# Patient Record
Sex: MALE | Race: OTHER | HISPANIC OR LATINO | Employment: FULL TIME | URBAN - METROPOLITAN AREA
[De-identification: names, ages, dates, MRNs, and addresses within clinical notes are randomized per-mention and may not be internally consistent; named-entity substitution may affect disease eponyms.]

---

## 2023-08-15 ENCOUNTER — HOSPITAL ENCOUNTER (EMERGENCY)
Facility: HOSPITAL | Age: 33
Discharge: HOME | End: 2023-08-15
Attending: INTERNAL MEDICINE

## 2023-08-15 ENCOUNTER — APPOINTMENT (EMERGENCY)
Dept: CT IMAGING | Facility: HOSPITAL | Age: 33
End: 2023-08-15

## 2023-08-15 VITALS
DIASTOLIC BLOOD PRESSURE: 90 MMHG | TEMPERATURE: 97.7 F | RESPIRATION RATE: 18 BRPM | OXYGEN SATURATION: 98 % | HEART RATE: 93 BPM | SYSTOLIC BLOOD PRESSURE: 150 MMHG

## 2023-08-15 DIAGNOSIS — N23 URETERAL COLIC: Primary | ICD-10-CM

## 2023-08-15 DIAGNOSIS — N20.0 NEPHROLITHIASIS: ICD-10-CM

## 2023-08-15 LAB
ALBUMIN SERPL BCP-MCNC: 4.6 G/DL (ref 3.5–5)
ALP SERPL-CCNC: 55 U/L (ref 34–104)
ALT SERPL W P-5'-P-CCNC: 17 U/L (ref 7–52)
ANION GAP SERPL CALCULATED.3IONS-SCNC: 12 MMOL/L
AST SERPL W P-5'-P-CCNC: 16 U/L (ref 13–39)
BACTERIA UR QL AUTO: NORMAL /HPF
BASOPHILS # BLD MANUAL: 0 THOUSAND/UL (ref 0–0.1)
BASOPHILS NFR MAR MANUAL: 0 % (ref 0–1)
BILIRUB SERPL-MCNC: 0.36 MG/DL (ref 0.2–1)
BILIRUB UR QL STRIP: NEGATIVE
BUN SERPL-MCNC: 13 MG/DL (ref 5–25)
CALCIUM SERPL-MCNC: 9.2 MG/DL (ref 8.4–10.2)
CHLORIDE SERPL-SCNC: 103 MMOL/L (ref 96–108)
CLARITY UR: CLEAR
CO2 SERPL-SCNC: 23 MMOL/L (ref 21–32)
COLOR UR: YELLOW
CREAT SERPL-MCNC: 1.28 MG/DL (ref 0.6–1.3)
EOSINOPHIL # BLD MANUAL: 0 THOUSAND/UL (ref 0–0.4)
EOSINOPHIL NFR BLD MANUAL: 0 % (ref 0–6)
ERYTHROCYTE [DISTWIDTH] IN BLOOD BY AUTOMATED COUNT: 12.5 % (ref 11.6–15.1)
GFR SERPL CREATININE-BSD FRML MDRD: 72 ML/MIN/1.73SQ M
GLUCOSE SERPL-MCNC: 150 MG/DL (ref 65–140)
GLUCOSE UR STRIP-MCNC: NEGATIVE MG/DL
HCT VFR BLD AUTO: 44.5 % (ref 36.5–49.3)
HGB BLD-MCNC: 14.7 G/DL (ref 12–17)
HGB UR QL STRIP.AUTO: NEGATIVE
KETONES UR STRIP-MCNC: NEGATIVE MG/DL
LEUKOCYTE ESTERASE UR QL STRIP: NEGATIVE
LYMPHOCYTES # BLD AUTO: 59 % (ref 14–44)
LYMPHOCYTES # BLD AUTO: 6.33 THOUSAND/UL (ref 0.6–4.47)
MCH RBC QN AUTO: 27.4 PG (ref 26.8–34.3)
MCHC RBC AUTO-ENTMCNC: 33 G/DL (ref 31.4–37.4)
MCV RBC AUTO: 83 FL (ref 82–98)
MONOCYTES # BLD AUTO: 0.43 THOUSAND/UL (ref 0–1.22)
MONOCYTES NFR BLD: 4 % (ref 4–12)
NEUTROPHILS # BLD MANUAL: 3.97 THOUSAND/UL (ref 1.85–7.62)
NEUTS SEG NFR BLD AUTO: 37 % (ref 43–75)
NITRITE UR QL STRIP: NEGATIVE
NON-SQ EPI CELLS URNS QL MICRO: NORMAL /HPF
PH UR STRIP.AUTO: 5.5 [PH]
PLATELET # BLD AUTO: 324 THOUSANDS/UL (ref 149–390)
PLATELET BLD QL SMEAR: ADEQUATE
PMV BLD AUTO: 10.1 FL (ref 8.9–12.7)
POTASSIUM SERPL-SCNC: 3.4 MMOL/L (ref 3.5–5.3)
PROT SERPL-MCNC: 7.9 G/DL (ref 6.4–8.4)
PROT UR STRIP-MCNC: ABNORMAL MG/DL
RBC # BLD AUTO: 5.37 MILLION/UL (ref 3.88–5.62)
RBC #/AREA URNS AUTO: NORMAL /HPF
RBC MORPH BLD: NORMAL
SODIUM SERPL-SCNC: 138 MMOL/L (ref 135–147)
SP GR UR STRIP.AUTO: >=1.03 (ref 1–1.03)
UROBILINOGEN UR QL STRIP.AUTO: 0.2 E.U./DL
WBC # BLD AUTO: 10.73 THOUSAND/UL (ref 4.31–10.16)
WBC #/AREA URNS AUTO: NORMAL /HPF

## 2023-08-15 PROCEDURE — 96374 THER/PROPH/DIAG INJ IV PUSH: CPT

## 2023-08-15 PROCEDURE — 74176 CT ABD & PELVIS W/O CONTRAST: CPT

## 2023-08-15 PROCEDURE — 80053 COMPREHEN METABOLIC PANEL: CPT | Performed by: INTERNAL MEDICINE

## 2023-08-15 PROCEDURE — 96375 TX/PRO/DX INJ NEW DRUG ADDON: CPT

## 2023-08-15 PROCEDURE — 36415 COLL VENOUS BLD VENIPUNCTURE: CPT | Performed by: INTERNAL MEDICINE

## 2023-08-15 PROCEDURE — 99284 EMERGENCY DEPT VISIT MOD MDM: CPT

## 2023-08-15 PROCEDURE — 99285 EMERGENCY DEPT VISIT HI MDM: CPT | Performed by: INTERNAL MEDICINE

## 2023-08-15 PROCEDURE — 81001 URINALYSIS AUTO W/SCOPE: CPT | Performed by: INTERNAL MEDICINE

## 2023-08-15 PROCEDURE — G1004 CDSM NDSC: HCPCS

## 2023-08-15 PROCEDURE — 96361 HYDRATE IV INFUSION ADD-ON: CPT

## 2023-08-15 PROCEDURE — 85027 COMPLETE CBC AUTOMATED: CPT | Performed by: INTERNAL MEDICINE

## 2023-08-15 PROCEDURE — 85007 BL SMEAR W/DIFF WBC COUNT: CPT | Performed by: INTERNAL MEDICINE

## 2023-08-15 RX ORDER — ONDANSETRON 2 MG/ML
4 INJECTION INTRAMUSCULAR; INTRAVENOUS ONCE
Status: COMPLETED | OUTPATIENT
Start: 2023-08-15 | End: 2023-08-15

## 2023-08-15 RX ORDER — KETOROLAC TROMETHAMINE 30 MG/ML
15 INJECTION, SOLUTION INTRAMUSCULAR; INTRAVENOUS ONCE
Status: COMPLETED | OUTPATIENT
Start: 2023-08-15 | End: 2023-08-15

## 2023-08-15 RX ORDER — MORPHINE SULFATE 4 MG/ML
4 INJECTION, SOLUTION INTRAMUSCULAR; INTRAVENOUS ONCE
Status: COMPLETED | OUTPATIENT
Start: 2023-08-15 | End: 2023-08-15

## 2023-08-15 RX ADMIN — MORPHINE SULFATE 4 MG: 4 INJECTION INTRAVENOUS at 20:30

## 2023-08-15 RX ADMIN — SODIUM CHLORIDE 1000 ML: 0.9 INJECTION, SOLUTION INTRAVENOUS at 20:30

## 2023-08-15 RX ADMIN — ONDANSETRON 4 MG: 2 INJECTION INTRAMUSCULAR; INTRAVENOUS at 20:30

## 2023-08-15 RX ADMIN — KETOROLAC TROMETHAMINE 15 MG: 30 INJECTION, SOLUTION INTRAMUSCULAR; INTRAVENOUS at 20:29

## 2023-08-15 NOTE — Clinical Note
Rochejuliocesar David was seen and treated in our emergency department on 8/15/2023. No restrictions            Diagnosis:     Uriel Arias  may return to work on return date. He may return on this date: 08/18/2023         If you have any questions or concerns, please don't hesitate to call.       Luisa Ruiz RN    ______________________________           _______________          _______________  Hospital Representative                              Date                                Time

## 2023-08-16 NOTE — ED PROVIDER NOTES
History  Chief Complaint   Patient presents with   • Flank Pain     Pt presents to the ed with right sided flank pain that started 30mins ago, reports its feels like kidney stones he has had in the past, no meds pta       This is a 27years old came for having right flank pain which is radiating to his right testicle. Patient has pain about 30 minutes PTA. Patient has nausea and he vomited x1. Patient denies any dysuria hematuria. Patient has history of kidney stones in the past.  Patient stated pain is 10/10. Patient denies abdominal pain. Patient has no fever. Patient has no medical history except kidney stones. None       History reviewed. No pertinent past medical history. History reviewed. No pertinent surgical history. History reviewed. No pertinent family history. I have reviewed and agree with the history as documented. E-Cigarette/Vaping     E-Cigarette/Vaping Substances     Social History     Tobacco Use   • Smoking status: Never   • Smokeless tobacco: Never       Review of Systems   Constitutional: Negative for diaphoresis, fatigue and fever. HENT: Negative for congestion, ear discharge, ear pain, sinus pressure, sneezing, sore throat, tinnitus and trouble swallowing. Respiratory: Negative for cough, chest tightness and shortness of breath. Cardiovascular: Negative for chest pain, palpitations and leg swelling. Gastrointestinal: Positive for vomiting. Negative for abdominal pain, diarrhea and nausea. Genitourinary: Positive for flank pain and testicular pain. Negative for decreased urine volume, difficulty urinating, dysuria, hematuria, penile swelling, scrotal swelling and urgency. Musculoskeletal: Negative for arthralgias, back pain, gait problem, neck pain and neck stiffness. Skin: Negative for color change, pallor and rash. Neurological: Negative for dizziness, syncope, weakness, light-headedness and headaches. Hematological: Negative for adenopathy.  Does not bruise/bleed easily. Psychiatric/Behavioral: Negative for agitation and behavioral problems. Physical Exam  Physical Exam  Vitals and nursing note reviewed. Constitutional:       General: He is not in acute distress. Appearance: He is well-developed. He is not diaphoretic. HENT:      Head: Normocephalic and atraumatic. Right Ear: Ear canal normal.      Left Ear: Ear canal normal.      Nose: Nose normal. No congestion or rhinorrhea. Mouth/Throat:      Pharynx: No oropharyngeal exudate or posterior oropharyngeal erythema. Eyes:      Extraocular Movements: Extraocular movements intact. Pupils: Pupils are equal, round, and reactive to light. Neck:      Vascular: No carotid bruit. Cardiovascular:      Rate and Rhythm: Normal rate and regular rhythm. Heart sounds: Normal heart sounds. No murmur heard. No friction rub. Pulmonary:      Effort: Pulmonary effort is normal. No respiratory distress. Breath sounds: Normal breath sounds. No wheezing, rhonchi or rales. Chest:      Chest wall: No tenderness. Abdominal:      General: Bowel sounds are normal. There is no distension. Palpations: Abdomen is soft. There is no mass. Tenderness: There is no abdominal tenderness. There is right CVA tenderness. There is no left CVA tenderness, guarding or rebound. Hernia: No hernia is present. Musculoskeletal:         General: No tenderness or deformity. Normal range of motion. Cervical back: Normal range of motion and neck supple. No rigidity or tenderness. Lymphadenopathy:      Cervical: No cervical adenopathy. Skin:     General: Skin is warm and dry. Capillary Refill: Capillary refill takes less than 2 seconds. Coloration: Skin is not jaundiced or pale. Findings: No bruising, erythema, lesion or rash. Neurological:      Mental Status: He is alert and oriented to person, place, and time.    Psychiatric:         Behavior: Behavior normal. Vital Signs  ED Triage Vitals   Temperature Pulse Respirations Blood Pressure SpO2   08/15/23 2011 08/15/23 2011 08/15/23 2021 08/15/23 2011 08/15/23 2011   97.7 °F (36.5 °C) 93 18 150/90 98 %      Temp Source Heart Rate Source Patient Position - Orthostatic VS BP Location FiO2 (%)   08/15/23 2011 08/15/23 2011 08/15/23 2011 08/15/23 2011 --   Oral Monitor Sitting Left arm       Pain Score       08/15/23 2029       10 - Worst Possible Pain           Vitals:    08/15/23 2011   BP: 150/90   Pulse: 93   Patient Position - Orthostatic VS: Sitting         Visual Acuity      ED Medications  Medications   ketorolac (TORADOL) injection 15 mg (15 mg Intravenous Given 8/15/23 2029)   morphine injection 4 mg (4 mg Intravenous Given 8/15/23 2030)   ondansetron (ZOFRAN) injection 4 mg (4 mg Intravenous Given 8/15/23 2030)   sodium chloride 0.9 % bolus 1,000 mL (0 mL Intravenous Stopped 8/15/23 2212)       Diagnostic Studies  Results Reviewed     Procedure Component Value Units Date/Time    Urine Microscopic [[id number]]  (Normal) Collected: 08/15/23 2150    Lab Status: Final result Specimen: Urine, Clean Catch Updated: 08/15/23 2207     RBC, UA None Seen /hpf      WBC, UA 0-1 /hpf      Epithelial Cells Occasional /hpf      Bacteria, UA Occasional /hpf     RBC Morphology Reflex Test [[id number]] Collected: 08/15/23 2040    Lab Status: Final result Specimen: Blood from Arm, Left Updated: 08/15/23 2201    UA w Reflex to Microscopic w Reflex to Culture [[id number]]  (Abnormal) Collected: 08/15/23 2150    Lab Status: Final result Specimen: Urine, Clean Catch Updated: 08/15/23 2200     Color, UA Yellow     Clarity, UA Clear     Specific Gravity, UA >=1.030     pH, UA 5.5     Leukocytes, UA Negative     Nitrite, UA Negative     Protein, UA Trace mg/dl      Glucose, UA Negative mg/dl      Ketones, UA Negative mg/dl      Urobilinogen, UA 0.2 E.U./dl      Bilirubin, UA Negative     Occult Blood, UA Negative    CBC and differential [[id number]]  (Abnormal) Collected: 08/15/23 2040    Lab Status: Final result Specimen: Blood from Arm, Left Updated: 08/15/23 2121     WBC 10.73 Thousand/uL      RBC 5.37 Million/uL      Hemoglobin 14.7 g/dL      Hematocrit 44.5 %      MCV 83 fL      MCH 27.4 pg      MCHC 33.0 g/dL      RDW 12.5 %      MPV 10.1 fL      Platelets 994 Thousands/uL     Narrative: This is an appended report. These results have been appended to a previously verified report.     Manual Differential(PHLEBS Do Not Order) [[id number]]  (Abnormal) Collected: 08/15/23 2040    Lab Status: Final result Specimen: Blood from Arm, Left Updated: 08/15/23 2121     Segmented % 37 %      Lymphocytes % 59 %      Monocytes % 4 %      Eosinophils, % 0 %      Basophils % 0 %      Absolute Neutrophils 3.97 Thousand/uL      Lymphocytes Absolute 6.33 Thousand/uL      Monocytes Absolute 0.43 Thousand/uL      Eosinophils Absolute 0.00 Thousand/uL      Basophils Absolute 0.00 Thousand/uL      Total Counted --     RBC Morphology Normal     Platelet Estimate Adequate    Comprehensive metabolic panel [[id number]]  (Abnormal) Collected: 08/15/23 2040    Lab Status: Final result Specimen: Blood from Arm, Left Updated: 08/15/23 2058     Sodium 138 mmol/L      Potassium 3.4 mmol/L      Chloride 103 mmol/L      CO2 23 mmol/L      ANION GAP 12 mmol/L      BUN 13 mg/dL      Creatinine 1.28 mg/dL      Glucose 150 mg/dL      Calcium 9.2 mg/dL      AST 16 U/L      ALT 17 U/L      Alkaline Phosphatase 55 U/L      Total Protein 7.9 g/dL      Albumin 4.6 g/dL      Total Bilirubin 0.36 mg/dL      eGFR 72 ml/min/1.73sq m     Narrative:      [place] guidelines for Chronic Kidney Disease (CKD):   •  Stage 1 with normal or high GFR (GFR > 90 mL/min/1.73 square meters)  •  Stage 2 Mild CKD (GFR = 60-89 mL/min/1.73 square meters)  •  Stage 3A Moderate CKD (GFR = 45-59 mL/min/1.73 square meters)  •  Stage 3B Moderate CKD (GFR = 30-44 mL/min/1.73 square meters)  •  Stage 4 Severe CKD (GFR = 15-29 mL/min/1.73 square meters)  •  Stage 5 End Stage CKD (GFR <15 mL/min/1.73 square meters)  Note: GFR calculation is accurate only with a steady state creatinine                 CT renal stone study abdomen pelvis wo contrast   Final Result by Luz Elena Buchanan MD (08/15 2143)      1.  5 mm right UVJ stone causes moderate hydronephrosis   2. Additional bilateral nonobstructing renal stones               Workstation performed: [id number]                    Procedures  Procedures         ED Course                               SBIRT 22yo+    Flowsheet Row Most Recent Value   Initial Alcohol Screen: US AUDIT-C     1. How often do you have a drink containing alcohol? 0 Filed at: 08/15/2023 2054   2. How many drinks containing alcohol do you have on a typical day you are drinking? 0 Filed at: 08/15/2023 2054   3a. Male UNDER 65: How often do you have five or more drinks on one occasion? 0 Filed at: 08/15/2023 2054   3b. FEMALE Any Age, or MALE 65+: How often do you have 4 or more drinks on one occassion? 0 Filed at: 08/15/2023 2054   Audit-C Score 0 Filed at: 08/15/2023 2054   JUAN: How many times in the past year have you. .. Used an illegal drug or used a prescription medication for non-medical reasons? Never Filed at: 08/15/2023 2054                    Medical Decision Making  This is a 27years old came for having right flank pain radiating into his right testicle. Patient has this pain about half an hour before he came to the ER. Pain was so severe patient denies any dysuria hematuria. Physical exam shows mild tenderness in the right flank area. Amount and/or Complexity of Data Reviewed  Labs: ordered. Radiology: ordered. Risk  Prescription drug management.           Disposition  Final diagnoses:   Ureteral colic   Nephrolithiasis     Time reflects when diagnosis was documented in both MDM as applicable and the Disposition within this note     Time User Action Codes Description Comment    8/15/2023  8:59 PM Eliseo Marvin Add [J75] Ureteral colic     3/36/5847  0:02 PM Eliseo Marvin Add [N20.1] Ureterolithiasis     8/15/2023  9:49 PM Jassi Bennington Remove [N20.1] Ureterolithiasis     8/15/2023  9:49 PM Jassi Bennington Add [N20.0] Nephrolithiasis       ED Disposition     ED Disposition   Discharge    Condition   Stable    Date/Time   Tue Aug 15, 2023  9:49 PM    Comment   Cameron Rae discharge to home/self care. Follow-up Information     Follow up With Specialties Details Why Contact Info Additional 1016 [facility]) Urology Schedule an appointment as soon as possible for a visit in 3 days For follow-up [address] [id number] 3318 [facility] [address], [address] [facility]          There are no discharge medications for this patient. No discharge procedures on file.     PDMP Review     None          ED Provider  Electronically Signed by           Lakeisha Dean MD  08/16/23 4100

## 2023-08-16 NOTE — ED CARE HANDOFF
Emergency Department Sign Out Note        Sign out and transfer of care from Dr. Dafne Pierre. See Separate Emergency Department note. The patient, Rosalina Aguilar, was evaluated by the previous provider for flank pain. Workup Completed:  Labs and imaging complete    ED Course / Workup Pending (followup):  Pending CT read, and likely discharge home. Procedures  Medical Decision Making  The patient was found to have a 5 mm stone, he is safe for discharge home, as he does not have a urinary tract infection, and he will be instructed to follow-up with urology as an outpatient, drink plenty of fluids, and return if pain worsens or does not improve over the next few days. Amount and/or Complexity of Data Reviewed  Labs: ordered. Radiology: ordered. Risk  Prescription drug management. Disposition  Final diagnoses:   Ureteral colic   Nephrolithiasis     Time reflects when diagnosis was documented in both MDM as applicable and the Disposition within this note     Time User Action Codes Description Comment    8/15/2023  8:59 PM Eliseo Marvin Add [B84] Ureteral colic     8/63/6261  9:16 PM Eliseo Marvin Add [N20.1] Ureterolithiasis     8/15/2023  9:49 PM Lizy Dunn Remove [N20.1] Ureterolithiasis     8/15/2023  9:49 PM Lizy Dunn Add [N20.0] Nephrolithiasis       ED Disposition     ED Disposition   Discharge    Condition   Stable    Date/Time   Tue Aug 15, 2023  9:49 PM    Comment   Cameron Rae discharge to home/self care.                Follow-up Information     Follow up With Specialties Details Why Contact Info Additional 3413 [facility] Schedule an appointment as soon as possible for a visit in 3 days For follow-up [address] [phone] 9595 [facility]), [address] [place] [address], [address] [facility] There are no discharge medications for this patient. No discharge procedures on file.        ED Provider  Electronically Signed by     Osorio Henry MD  08/15/23 2759

## 2023-08-16 NOTE — ED NOTES
Discharge instructions reviewed with pt. Pt verbalized understanding. And has no further questions at this time. Pt ambulatory off unit with steady gait.       Nat Lin RN  08/15/23 0265

## 2023-08-17 ENCOUNTER — HOSPITAL ENCOUNTER (OUTPATIENT)
Facility: HOSPITAL | Age: 33
Setting detail: OBSERVATION
Discharge: ANOTHER HOSPITAL | End: 2023-08-18
Attending: EMERGENCY MEDICINE | Admitting: INTERNAL MEDICINE
Payer: COMMERCIAL

## 2023-08-17 ENCOUNTER — APPOINTMENT (OUTPATIENT)
Dept: RADIOLOGY | Facility: HOSPITAL | Age: 33
End: 2023-08-17
Payer: COMMERCIAL

## 2023-08-17 DIAGNOSIS — N23 RENAL COLIC ON RIGHT SIDE: ICD-10-CM

## 2023-08-17 DIAGNOSIS — N20.1 URETERIC STONE: ICD-10-CM

## 2023-08-17 DIAGNOSIS — N17.9 ACUTE KIDNEY INJURY (HCC): Primary | ICD-10-CM

## 2023-08-17 PROBLEM — D72.829 LEUKOCYTOSIS: Status: ACTIVE | Noted: 2023-08-17

## 2023-08-17 LAB
ANION GAP SERPL CALCULATED.3IONS-SCNC: 9 MMOL/L
BACTERIA UR QL AUTO: NORMAL /HPF
BASOPHILS # BLD AUTO: 0.02 THOUSANDS/ÂΜL (ref 0–0.1)
BASOPHILS NFR BLD AUTO: 0 % (ref 0–1)
BILIRUB UR QL STRIP: NEGATIVE
BUN SERPL-MCNC: 18 MG/DL (ref 5–25)
CALCIUM SERPL-MCNC: 9.4 MG/DL (ref 8.4–10.2)
CHLORIDE SERPL-SCNC: 104 MMOL/L (ref 96–108)
CLARITY UR: CLEAR
CO2 SERPL-SCNC: 25 MMOL/L (ref 21–32)
COLOR UR: YELLOW
CREAT SERPL-MCNC: 2.12 MG/DL (ref 0.6–1.3)
EOSINOPHIL # BLD AUTO: 0.04 THOUSAND/ÂΜL (ref 0–0.61)
EOSINOPHIL NFR BLD AUTO: 0 % (ref 0–6)
ERYTHROCYTE [DISTWIDTH] IN BLOOD BY AUTOMATED COUNT: 12.7 % (ref 11.6–15.1)
GFR SERPL CREATININE-BSD FRML MDRD: 39 ML/MIN/1.73SQ M
GLUCOSE SERPL-MCNC: 92 MG/DL (ref 65–140)
GLUCOSE UR STRIP-MCNC: NEGATIVE MG/DL
HCT VFR BLD AUTO: 43.9 % (ref 36.5–49.3)
HGB BLD-MCNC: 14.7 G/DL (ref 12–17)
HGB UR QL STRIP.AUTO: ABNORMAL
IMM GRANULOCYTES # BLD AUTO: 0.04 THOUSAND/UL (ref 0–0.2)
IMM GRANULOCYTES NFR BLD AUTO: 0 % (ref 0–2)
KETONES UR STRIP-MCNC: NEGATIVE MG/DL
LEUKOCYTE ESTERASE UR QL STRIP: NEGATIVE
LYMPHOCYTES # BLD AUTO: 3.19 THOUSANDS/ÂΜL (ref 0.6–4.47)
LYMPHOCYTES NFR BLD AUTO: 22 % (ref 14–44)
MCH RBC QN AUTO: 27.4 PG (ref 26.8–34.3)
MCHC RBC AUTO-ENTMCNC: 33.5 G/DL (ref 31.4–37.4)
MCV RBC AUTO: 82 FL (ref 82–98)
MONOCYTES # BLD AUTO: 1.22 THOUSAND/ÂΜL (ref 0.17–1.22)
MONOCYTES NFR BLD AUTO: 9 % (ref 4–12)
NEUTROPHILS # BLD AUTO: 9.82 THOUSANDS/ÂΜL (ref 1.85–7.62)
NEUTS SEG NFR BLD AUTO: 69 % (ref 43–75)
NITRITE UR QL STRIP: NEGATIVE
NON-SQ EPI CELLS URNS QL MICRO: NORMAL /HPF
NRBC BLD AUTO-RTO: 0 /100 WBCS
PH UR STRIP.AUTO: 5.5 [PH]
PLATELET # BLD AUTO: 283 THOUSANDS/UL (ref 149–390)
PMV BLD AUTO: 9.9 FL (ref 8.9–12.7)
POTASSIUM SERPL-SCNC: 4.1 MMOL/L (ref 3.5–5.3)
PROT UR STRIP-MCNC: NEGATIVE MG/DL
RBC # BLD AUTO: 5.37 MILLION/UL (ref 3.88–5.62)
RBC #/AREA URNS AUTO: NORMAL /HPF
SODIUM SERPL-SCNC: 138 MMOL/L (ref 135–147)
SP GR UR STRIP.AUTO: 1.02 (ref 1–1.03)
UROBILINOGEN UR QL STRIP.AUTO: 0.2 E.U./DL
WBC # BLD AUTO: 14.33 THOUSAND/UL (ref 4.31–10.16)
WBC #/AREA URNS AUTO: NORMAL /HPF

## 2023-08-17 PROCEDURE — 99284 EMERGENCY DEPT VISIT MOD MDM: CPT

## 2023-08-17 PROCEDURE — 99222 1ST HOSP IP/OBS MODERATE 55: CPT | Performed by: INTERNAL MEDICINE

## 2023-08-17 PROCEDURE — 80048 BASIC METABOLIC PNL TOTAL CA: CPT | Performed by: EMERGENCY MEDICINE

## 2023-08-17 PROCEDURE — 96374 THER/PROPH/DIAG INJ IV PUSH: CPT

## 2023-08-17 PROCEDURE — 99285 EMERGENCY DEPT VISIT HI MDM: CPT | Performed by: EMERGENCY MEDICINE

## 2023-08-17 PROCEDURE — 36415 COLL VENOUS BLD VENIPUNCTURE: CPT | Performed by: EMERGENCY MEDICINE

## 2023-08-17 PROCEDURE — 96375 TX/PRO/DX INJ NEW DRUG ADDON: CPT

## 2023-08-17 PROCEDURE — 96361 HYDRATE IV INFUSION ADD-ON: CPT

## 2023-08-17 PROCEDURE — 96376 TX/PRO/DX INJ SAME DRUG ADON: CPT

## 2023-08-17 PROCEDURE — 81001 URINALYSIS AUTO W/SCOPE: CPT | Performed by: EMERGENCY MEDICINE

## 2023-08-17 PROCEDURE — 85025 COMPLETE CBC W/AUTO DIFF WBC: CPT | Performed by: EMERGENCY MEDICINE

## 2023-08-17 RX ORDER — TAMSULOSIN HYDROCHLORIDE 0.4 MG/1
0.4 CAPSULE ORAL
Status: DISCONTINUED | OUTPATIENT
Start: 2023-08-17 | End: 2023-08-18 | Stop reason: HOSPADM

## 2023-08-17 RX ORDER — ACETAMINOPHEN 500 MG
500 TABLET ORAL EVERY 6 HOURS PRN
COMMUNITY

## 2023-08-17 RX ORDER — OXYCODONE HYDROCHLORIDE 5 MG/1
5 TABLET ORAL EVERY 4 HOURS PRN
Status: DISCONTINUED | OUTPATIENT
Start: 2023-08-17 | End: 2023-08-18 | Stop reason: HOSPADM

## 2023-08-17 RX ORDER — OXYBUTYNIN CHLORIDE 5 MG/1
5 TABLET ORAL 3 TIMES DAILY
Status: DISCONTINUED | OUTPATIENT
Start: 2023-08-17 | End: 2023-08-18 | Stop reason: HOSPADM

## 2023-08-17 RX ORDER — ONDANSETRON 2 MG/ML
4 INJECTION INTRAMUSCULAR; INTRAVENOUS EVERY 6 HOURS PRN
Status: DISCONTINUED | OUTPATIENT
Start: 2023-08-17 | End: 2023-08-18 | Stop reason: HOSPADM

## 2023-08-17 RX ORDER — MORPHINE SULFATE 4 MG/ML
4 INJECTION, SOLUTION INTRAMUSCULAR; INTRAVENOUS ONCE
Status: COMPLETED | OUTPATIENT
Start: 2023-08-17 | End: 2023-08-17

## 2023-08-17 RX ORDER — KETOROLAC TROMETHAMINE 30 MG/ML
15 INJECTION, SOLUTION INTRAMUSCULAR; INTRAVENOUS ONCE
Status: COMPLETED | OUTPATIENT
Start: 2023-08-17 | End: 2023-08-17

## 2023-08-17 RX ORDER — ONDANSETRON 2 MG/ML
4 INJECTION INTRAMUSCULAR; INTRAVENOUS ONCE
Status: COMPLETED | OUTPATIENT
Start: 2023-08-17 | End: 2023-08-17

## 2023-08-17 RX ORDER — SODIUM CHLORIDE, SODIUM LACTATE, POTASSIUM CHLORIDE, CALCIUM CHLORIDE 600; 310; 30; 20 MG/100ML; MG/100ML; MG/100ML; MG/100ML
150 INJECTION, SOLUTION INTRAVENOUS CONTINUOUS
Status: DISCONTINUED | OUTPATIENT
Start: 2023-08-17 | End: 2023-08-18

## 2023-08-17 RX ORDER — IBUPROFEN 400 MG/1
400 TABLET ORAL EVERY 6 HOURS PRN
COMMUNITY

## 2023-08-17 RX ADMIN — SODIUM CHLORIDE 1000 ML: 0.9 INJECTION, SOLUTION INTRAVENOUS at 15:57

## 2023-08-17 RX ADMIN — SODIUM CHLORIDE, SODIUM LACTATE, POTASSIUM CHLORIDE, AND CALCIUM CHLORIDE 150 ML/HR: .6; .31; .03; .02 INJECTION, SOLUTION INTRAVENOUS at 18:40

## 2023-08-17 RX ADMIN — OXYCODONE HYDROCHLORIDE 5 MG: 5 TABLET ORAL at 20:24

## 2023-08-17 RX ADMIN — MORPHINE SULFATE 4 MG: 4 INJECTION INTRAVENOUS at 16:01

## 2023-08-17 RX ADMIN — MORPHINE SULFATE 2 MG: 2 INJECTION, SOLUTION INTRAMUSCULAR; INTRAVENOUS at 18:43

## 2023-08-17 RX ADMIN — MORPHINE SULFATE 4 MG: 4 INJECTION INTRAVENOUS at 17:03

## 2023-08-17 RX ADMIN — OXYBUTYNIN CHLORIDE 5 MG: 5 TABLET ORAL at 20:31

## 2023-08-17 RX ADMIN — ONDANSETRON 4 MG: 2 INJECTION INTRAMUSCULAR; INTRAVENOUS at 16:00

## 2023-08-17 RX ADMIN — TAMSULOSIN HYDROCHLORIDE 0.4 MG: 0.4 CAPSULE ORAL at 18:26

## 2023-08-17 RX ADMIN — KETOROLAC TROMETHAMINE 15 MG: 30 INJECTION, SOLUTION INTRAMUSCULAR; INTRAVENOUS at 15:58

## 2023-08-17 NOTE — ASSESSMENT & PLAN NOTE
· Patient presents with right flank pain associated with nausea. · CAT scan of the abdomen was reviewed shows right UVJ stone 5 mm, also moderate hydronephrosis,  · Plan of care was discussed by the ED physician with the urologist who recommended that will give aggressive IV fluids and pain management and strain urine.    · Over the course the night the patient developed a fever and continued to have leukocytosis, blood cultures were drawn, IV antibiotics were started, and antipyretics were initiated  · Urology reviewed the case and determined the patient needs to be sent to [facility] for a stent

## 2023-08-17 NOTE — ASSESSMENT & PLAN NOTE
· Reactive secondary to right flank pain and UVJ stone. Monitor for any fever spike consider antibiotic if febrile or no improvement.   · UA did not indicate UTI  · Had a fever of 100.8 degrees F, obtain blood cultures, started ceftriaxone, will be sent to [facility] for additional care

## 2023-08-17 NOTE — ASSESSMENT & PLAN NOTE
· Patient presents with right flank pain associated with nausea. · CAT scan of the abdomen was reviewed shows right UVJ stone 5 mm, also moderate hydronephrosis,  · Plan of care was discussed by the ED physician with the urologist who recommended that will give aggressive IV fluids and pain management and strain urine. Patient if still has not passed the stone will keep n.p.o. from midnight and may need transfer out for boomerang procedure.

## 2023-08-17 NOTE — ASSESSMENT & PLAN NOTE
· ALEJANDRINA with obstructive uropathy, CT of the abdomen shows right UVJ 5 mm stone with moderate hydronephrosis, will give aggressive IV hydration and strain urine and check bladder scan. Will also start Flomax and oxybutynin. · Patient has also been taking ibuprofen/Motrin for the past 4 days.   · Will check urine eosinophils,  · Avoid nephrotoxins and hypotension and monitor electrolytes and BUNs/creatinine  · We will need to be sent to [facility] for cystoscopy and stent placement

## 2023-08-17 NOTE — ASSESSMENT & PLAN NOTE
· ALEJANDRINA with obstructive uropathy, CT of the abdomen shows right UVJ 5 mm stone with moderate hydronephrosis, will give aggressive IV hydration and strain urine and check bladder scan. Will also start Flomax and oxybutynin. · Avoid nephrotoxins and hypotension and monitor electrolytes and BUNs/creatinine  · If renal function worsens will need cystoscopy and stent placement.

## 2023-08-17 NOTE — QUICK NOTE
Received TT about patient Cameron 27year-old presenting to the ED with right flank pain for the past 4 days. Patient noted to have right UVJ 5 mm stone with moderate hydronephrosis on CT scan on 8/15/2023. Vital signs stable, afebrile. Leukocytosis of 14. Creatinine 2.12 today, previously 1.28 during previous ED visit 2 days ago. Plan:  -Admit to [facility]  -IV fluids  -Strain all urine  -Repeat BMP in the a.m. If improvement and pain controlled, patient stable for discharge.   -If worsening kidney function, will consider transfer to [facility] for operative intervention.  -N.p.o. at midnight  -Please reach out to on-call urology for any fevers or hemodynamic instability

## 2023-08-17 NOTE — H&P
[facility]  H&P  Name: Jorje Hernández 27 y.o. male I MRN: [id number]  Unit/Bed#: -01 I Date of Admission: 8/17/2023   Date of Service: 8/17/2023 I Hospital Day: 0      Assessment/Plan   Leukocytosis  Assessment & Plan  · Reactive secondary to right flank pain and UVJ stone. Monitor for any fever spike consider antibiotic if febrile or no improvement. Acute kidney injury ([address])  Assessment & Plan  · ALEJANDRINA with obstructive uropathy, CT of the abdomen shows right UVJ 5 mm stone with moderate hydronephrosis, will give aggressive IV hydration and strain urine and check bladder scan. Will also start Flomax and oxybutynin. · Patient has also been taking ibuprofen/Motrin for the past 4 days. · Will check urine eosinophils,  · Avoid nephrotoxins and hypotension and monitor electrolytes and BUNs/creatinine  · If renal function worsens will need cystoscopy and stent placement. * Ureteric stone  Assessment & Plan  · Patient presents with right flank pain associated with nausea. · CAT scan of the abdomen was reviewed shows right UVJ stone 5 mm, also moderate hydronephrosis,  · Plan of care was discussed by the ED physician with the urologist who recommended that will give aggressive IV fluids and pain management and strain urine. Patient if still has not passed the stone will keep n.p.o. from midnight and may need transfer out for boomerang procedure. VTE Pharmacologic Prophylaxis:   High Risk (Score >/= 5) - Pharmacological DVT Prophylaxis Contraindicated. Sequential Compression Devices Ordered. Code Status: Level 1 - Full Code   Discussion with family: Updated  (mother) at bedside. Anticipated Length of Stay: Patient will be admitted on an observation basis with an anticipated length of stay of less than 2 midnights secondary to Right UVJ stone, with acute kidney injury.     Total Time Spent on Date of Encounter in care of patient: 55 minutes This time was spent on one or more of the following: performing physical exam; counseling and coordination of care; obtaining or reviewing history; documenting in the medical record; reviewing/ordering tests, medications or procedures; communicating with other healthcare professionals and discussing with patient's family/caregivers. Chief Complaint: Right flank pain    History of Present Illness:  Shawn Mireles is a 27 y.o. male with no medical history presents with right flank pain for the past 4 days. Planes of nausea and one episode of vomiting. Patient has been taking Motrin/ibuprofen for the past 4 days for the pain without relief. .  Patient noted to have right UVJ 5 mm stone with moderate hydronephrosis on CT of the abdomen. Pain is intensity of 8/10. Received IV morphine and IV fluid bolus. Urology was contacted from the ED and recommended aggressive IV fluid hydration and straining of urine and if worsening renal function and persistent pain may need transfer and will keep patient n.p.o. from midnight. Patient denies of fever chills or cough. Denies of any headache or blurry vision or vomiting. Review of Systems:  Review of Systems   Gastrointestinal: Positive for nausea. Genitourinary: Positive for flank pain. All other systems reviewed and are negative. Past Medical and Surgical History:   History reviewed. No pertinent past medical history. History reviewed. No pertinent surgical history. Meds/Allergies:  Prior to Admission medications    Medication Sig Start Date End Date Taking? Authorizing Provider   acetaminophen (TYLENOL) 500 mg tablet Take 500 mg by mouth every 6 (six) hours as needed for mild pain   Yes Historical Provider, MD   ibuprofen (MOTRIN) 400 mg tablet Take 400 mg by mouth every 6 (six) hours as needed for mild pain   Yes Historical Provider, MD     I have reviewed home medications with patient personally. Allergies:    Allergies   Allergen Reactions   • Cortisone Hives       Social History:  Marital Status: Single   Occupation: employed  Patient Pre-hospital Living Situation: Home  Patient Pre-hospital Level of Mobility: walks  Patient Pre-hospital Diet Restrictions: nil  Substance Use History:   Social History     Substance and Sexual Activity   Alcohol Use Yes     Social History     Tobacco Use   Smoking Status Never   Smokeless Tobacco Never     Social History     Substance and Sexual Activity   Drug Use Never       Family History:  History reviewed. No pertinent family history. Physical Exam:     Vitals:   Blood Pressure: 158/87 (08/17/23 1545)  Pulse: 89 (08/17/23 1545)  Temperature: 97.5 °F (36.4 °C) (08/17/23 1545)  Temp Source: Oral (08/17/23 1545)  Respirations: 18 (08/17/23 1545)  Height: 5' 7" (170.2 cm) (08/17/23 1545)  Weight - Scale: 80.7 kg (178 lb) (08/17/23 1545)  SpO2: 99 % (08/17/23 1545)    Physical Exam   HEENT-PERRLA, moist oral mucosa  Neck-supple, no JVD elevation   Respiratory-equal air entry bilaterally, no rales or rhonchi  Cardiovascular system-S1, S2 heard, no murmur or gallops or rubs  Abdomen-soft, nontender, no guarding or rigidity, bowel sounds heard  Extremities-no pedal edema  Peripheral pulses palpable  Musculoskeletal-no contractures  Central nervous system-no acute focal neurological deficit ,no sensory or motor deficit noted.   Skin-no rash noted        Additional Data:     Lab Results:  Results from last 7 days   Lab Units 08/17/23  1557   WBC Thousand/uL 14.33*   HEMOGLOBIN g/dL 14.7   HEMATOCRIT % 43.9   PLATELETS Thousands/uL 283   NEUTROS PCT % 69   LYMPHS PCT % 22   MONOS PCT % 9   EOS PCT % 0     Results from last 7 days   Lab Units 08/17/23  1557 08/15/23  2040   SODIUM mmol/L 138 138   POTASSIUM mmol/L 4.1 3.4*   CHLORIDE mmol/L 104 103   CO2 mmol/L 25 23   BUN mg/dL 18 13   CREATININE mg/dL 2.12* 1.28   ANION GAP mmol/L 9 12   CALCIUM mg/dL 9.4 9.2   ALBUMIN g/dL  --  4.6   TOTAL BILIRUBIN mg/dL  --  0.36   ALK PHOS U/L  --  55   ALT U/L  --  17   AST U/L  --  16   GLUCOSE RANDOM mg/dL 92 150*                       Lines/Drains:  Invasive Devices     Peripheral Intravenous Line  Duration           Peripheral IV 08/17/23 Left Antecubital <1 day                    Imaging: Personally reviewed the following imaging: abdominal/pelvic CT  XR abdomen 1 view kub    (Results Pending)       EKG and Other Studies Reviewed on Admission:   · EKG: No EKG obtained. ** Please Note: This note has been constructed using a voice recognition system.  **

## 2023-08-17 NOTE — ED PROVIDER NOTES
History  Chief Complaint   Patient presents with   • Abdominal Pain     Pt states he was her last Tuesday for same pain in  the rt side of abdominal     The patient reports several day history of right sided flank pain. He was seen in the ED and diagnosed with a UVJ 3x5mm kidney stone causing moderate hydronephrosis. He returns to the ED with the same pain today. He denies burning or discomfort with urination. He denies left sided symptoms. NO fevers. He returned to ED today because his pain is not controlled with ibuprofen and tylenol. Prior to Admission Medications   Prescriptions Last Dose Informant Patient Reported? Taking?   acetaminophen (TYLENOL) 500 mg tablet 8/17/2023 Self Yes Yes   Sig: Take 500 mg by mouth every 6 (six) hours as needed for mild pain   ibuprofen (MOTRIN) 400 mg tablet 8/17/2023 Self Yes Yes   Sig: Take 400 mg by mouth every 6 (six) hours as needed for mild pain      Facility-Administered Medications: None       History reviewed. No pertinent past medical history. History reviewed. No pertinent surgical history. History reviewed. No pertinent family history. I have reviewed and agree with the history as documented. E-Cigarette/Vaping   • E-Cigarette Use Never User      E-Cigarette/Vaping Substances     Social History     Tobacco Use   • Smoking status: Never   • Smokeless tobacco: Never   Vaping Use   • Vaping Use: Never used   Substance Use Topics   • Alcohol use: Yes   • Drug use: Never       Review of Systems   All other systems reviewed and are negative. Physical Exam  Physical Exam  Vitals and nursing note reviewed. Constitutional:       General: He is not in acute distress. Appearance: He is well-developed. HENT:      Head: Normocephalic and atraumatic. Eyes:      Conjunctiva/sclera: Conjunctivae normal.   Cardiovascular:      Rate and Rhythm: Normal rate and regular rhythm. Heart sounds: No murmur heard.   Pulmonary:      Effort: Pulmonary effort is normal. No respiratory distress. Breath sounds: Normal breath sounds. Abdominal:      Palpations: Abdomen is soft. Tenderness: There is abdominal tenderness in the right lower quadrant. There is no guarding or rebound. Musculoskeletal:         General: No swelling. Cervical back: Neck supple. Skin:     General: Skin is warm and dry. Capillary Refill: Capillary refill takes less than 2 seconds. Neurological:      Mental Status: He is alert.    Psychiatric:         Mood and Affect: Mood normal.         Vital Signs  ED Triage Vitals [08/17/23 1545]   Temperature Pulse Respirations Blood Pressure SpO2   97.5 °F (36.4 °C) 89 18 158/87 99 %      Temp Source Heart Rate Source Patient Position - Orthostatic VS BP Location FiO2 (%)   Oral Monitor Sitting Right arm --      Pain Score       9           Vitals:    08/17/23 1545   BP: 158/87   Pulse: 89   Patient Position - Orthostatic VS: Sitting         Visual Acuity      ED Medications  Medications   tamsulosin (FLOMAX) capsule 0.4 mg (has no administration in time range)   oxybutynin (DITROPAN) tablet 5 mg (has no administration in time range)   lactated ringers infusion (has no administration in time range)   morphine injection 2 mg (has no administration in time range)   morphine injection 4 mg (4 mg Intravenous Given 8/17/23 1601)   ketorolac (TORADOL) injection 15 mg (15 mg Intravenous Given 8/17/23 1558)   ondansetron (ZOFRAN) injection 4 mg (4 mg Intravenous Given 8/17/23 1600)   sodium chloride 0.9 % bolus 1,000 mL (0 mL Intravenous Stopped 8/17/23 1748)   morphine injection 4 mg (4 mg Intravenous Given 8/17/23 1703)       Diagnostic Studies  Results Reviewed     Procedure Component Value Units Date/Time    Urine Microscopic [[id number]]  (Normal) Collected: 08/17/23 1654    Lab Status: Final result Specimen: Urine, Clean Catch Updated: 08/17/23 1710     RBC, UA None Seen /hpf      WBC, UA None Seen /hpf      Epithelial Cells None Seen /hpf      Bacteria, UA None Seen /hpf     UA (URINE) with reflex to Scope [[id number]]  (Abnormal) Collected: 08/17/23 1654    Lab Status: Final result Specimen: Urine, Clean Catch Updated: 08/17/23 1700     Color, UA Yellow     Clarity, UA Clear     Specific Gravity, UA 1.020     pH, UA 5.5     Leukocytes, UA Negative     Nitrite, UA Negative     Protein, UA Negative mg/dl      Glucose, UA Negative mg/dl      Ketones, UA Negative mg/dl      Urobilinogen, UA 0.2 E.U./dl      Bilirubin, UA Negative     Occult Blood, UA Trace-Intact    Basic metabolic panel [[id number]]  (Abnormal) Collected: 08/17/23 1557    Lab Status: Final result Specimen: Blood from Arm, Left Updated: 08/17/23 1639     Sodium 138 mmol/L      Potassium 4.1 mmol/L      Chloride 104 mmol/L      CO2 25 mmol/L      ANION GAP 9 mmol/L      BUN 18 mg/dL      Creatinine 2.12 mg/dL      Glucose 92 mg/dL      Calcium 9.4 mg/dL      eGFR 39 ml/min/1.73sq m     Narrative:      [place] guidelines for Chronic Kidney Disease (CKD):   •  Stage 1 with normal or high GFR (GFR > 90 mL/min/1.73 square meters)  •  Stage 2 Mild CKD (GFR = 60-89 mL/min/1.73 square meters)  •  Stage 3A Moderate CKD (GFR = 45-59 mL/min/1.73 square meters)  •  Stage 3B Moderate CKD (GFR = 30-44 mL/min/1.73 square meters)  •  Stage 4 Severe CKD (GFR = 15-29 mL/min/1.73 square meters)  •  Stage 5 End Stage CKD (GFR <15 mL/min/1.73 square meters)  Note: GFR calculation is accurate only with a steady state creatinine    CBC and differential [[id number]]  (Abnormal) Collected: 08/17/23 1557    Lab Status: Final result Specimen: Blood from Arm, Left Updated: 08/17/23 1608     WBC 14.33 Thousand/uL      RBC 5.37 Million/uL      Hemoglobin 14.7 g/dL      Hematocrit 43.9 %      MCV 82 fL      MCH 27.4 pg      MCHC 33.5 g/dL      RDW 12.7 %      MPV 9.9 fL      Platelets 940 Thousands/uL      nRBC 0 /100 WBCs      Neutrophils Relative 69 %      Immat GRANS % 0 % Lymphocytes Relative 22 %      Monocytes Relative 9 %      Eosinophils Relative 0 %      Basophils Relative 0 %      Neutrophils Absolute 9.82 Thousands/µL      Immature Grans Absolute 0.04 Thousand/uL      Lymphocytes Absolute 3.19 Thousands/µL      Monocytes Absolute 1.22 Thousand/µL      Eosinophils Absolute 0.04 Thousand/µL      Basophils Absolute 0.02 Thousands/µL                  XR abdomen 1 view kub    (Results Pending)              Procedures  Procedures         ED Course  ED Course as of 08/17/23 1811   Thu Aug 17, 2023   1608 WBC(!): 14.33  Elevated but no fevers. Will evaluate UA for suggestion of infection. 1741 WBC, UA: None Seen  Negative for infection   1742 Creatinine(!): 2.12  ALEJANDRINA noted. Case discussed with urology on call, ZAHRAA Fleming who advised admission to [facility] with IV hydration and would consider transfer if no improvement in ALEJANDRINA tomorrow. 1743 Case discussed with DR. Mary Peterson, who accepted the admission. SBIRT 20yo+    Flowsheet Row Most Recent Value   Initial Alcohol Screen: US AUDIT-C     1. How often do you have a drink containing alcohol? 2 Filed at: 08/17/2023 1545   2. How many drinks containing alcohol do you have on a typical day you are drinking? 1 Filed at: 08/17/2023 1545   3a. Male UNDER 65: How often do you have five or more drinks on one occasion? 0 Filed at: 08/17/2023 1545   3b. FEMALE Any Age, or MALE 65+: How often do you have 4 or more drinks on one occassion? 0 Filed at: 08/17/2023 1545   Audit-C Score 3 Filed at: 08/17/2023 1541   JUAN: How many times in the past year have you. .. Used an illegal drug or used a prescription medication for non-medical reasons? Never Filed at: 08/17/2023 1545                    Medical Decision Making  Patient has known kidney stone. Will r/o infection or ALEJANDRINA.   No symptoms to suggest left sided obstruction    Acute kidney injury [facility]): acute illness or injury  Amount and/or Complexity of Data Reviewed  Labs: ordered. Decision-making details documented in ED Course. Risk  Prescription drug management. Decision regarding hospitalization. Disposition  Final diagnoses:   Acute kidney injury [facility])   Renal colic on right side     Time reflects when diagnosis was documented in both MDM as applicable and the Disposition within this note     Time User Action Codes Description Comment    8/17/2023  5:41 PM Earnstine Jyothi Add [N17.9] Acute kidney injury ([address])     8/17/2023  5:41 PM Earnstine Jyothi Add [C89] Renal colic on right side       ED Disposition     None      Follow-up Information    None         Patient's Medications   Discharge Prescriptions    No medications on file       No discharge procedures on file.     PDMP Review       Value Time User    PDMP Reviewed  Yes 8/17/2023  3:39 PM Charbel Garcia MD          ED Provider  Electronically Signed by           Charbel Garcia MD  08/17/23 0426

## 2023-08-18 ENCOUNTER — APPOINTMENT (OUTPATIENT)
Dept: RADIOLOGY | Facility: HOSPITAL | Age: 33
End: 2023-08-18
Payer: COMMERCIAL

## 2023-08-18 ENCOUNTER — PREP FOR PROCEDURE (OUTPATIENT)
Dept: UROLOGY | Facility: CLINIC | Age: 33
End: 2023-08-18

## 2023-08-18 ENCOUNTER — ANESTHESIA (INPATIENT)
Dept: PERIOP | Facility: HOSPITAL | Age: 33
DRG: 465 | End: 2023-08-18
Payer: COMMERCIAL

## 2023-08-18 ENCOUNTER — HOSPITAL ENCOUNTER (INPATIENT)
Facility: HOSPITAL | Age: 33
LOS: 3 days | Discharge: HOME | DRG: 465 | End: 2023-08-21
Attending: UROLOGY | Admitting: INTERNAL MEDICINE
Payer: COMMERCIAL

## 2023-08-18 ENCOUNTER — ANESTHESIA EVENT (INPATIENT)
Dept: PERIOP | Facility: HOSPITAL | Age: 33
DRG: 465 | End: 2023-08-18
Payer: COMMERCIAL

## 2023-08-18 VITALS
OXYGEN SATURATION: 96 % | HEART RATE: 86 BPM | RESPIRATION RATE: 18 BRPM | WEIGHT: 177.2 LBS | HEIGHT: 67 IN | DIASTOLIC BLOOD PRESSURE: 70 MMHG | BODY MASS INDEX: 27.81 KG/M2 | SYSTOLIC BLOOD PRESSURE: 120 MMHG | TEMPERATURE: 98.7 F

## 2023-08-18 DIAGNOSIS — N13.2 URETERAL STONE WITH HYDRONEPHROSIS: Primary | ICD-10-CM

## 2023-08-18 DIAGNOSIS — N20.1 RIGHT URETERAL STONE: Primary | ICD-10-CM

## 2023-08-18 DIAGNOSIS — N17.9 ACUTE KIDNEY INJURY (HCC): ICD-10-CM

## 2023-08-18 DIAGNOSIS — N20.1 URETERIC STONE: ICD-10-CM

## 2023-08-18 LAB
ALBUMIN SERPL BCP-MCNC: 3.7 G/DL (ref 3.5–5)
ALP SERPL-CCNC: 47 U/L (ref 34–104)
ALT SERPL W P-5'-P-CCNC: 9 U/L (ref 7–52)
ANION GAP SERPL CALCULATED.3IONS-SCNC: 9 MMOL/L
AST SERPL W P-5'-P-CCNC: 10 U/L (ref 13–39)
BILIRUB SERPL-MCNC: 0.66 MG/DL (ref 0.2–1)
BUN SERPL-MCNC: 17 MG/DL (ref 5–25)
CALCIUM SERPL-MCNC: 8.7 MG/DL (ref 8.4–10.2)
CHLORIDE SERPL-SCNC: 103 MMOL/L (ref 96–108)
CO2 SERPL-SCNC: 24 MMOL/L (ref 21–32)
CREAT SERPL-MCNC: 2.02 MG/DL (ref 0.6–1.3)
ERYTHROCYTE [DISTWIDTH] IN BLOOD BY AUTOMATED COUNT: 12.4 % (ref 11.6–15.1)
GFR SERPL CREATININE-BSD FRML MDRD: 41 ML/MIN/1.73SQ M
GLUCOSE P FAST SERPL-MCNC: 88 MG/DL (ref 65–99)
GLUCOSE SERPL-MCNC: 88 MG/DL (ref 65–140)
HCT VFR BLD AUTO: 38 % (ref 36.5–49.3)
HGB BLD-MCNC: 12.5 G/DL (ref 12–17)
MCH RBC QN AUTO: 27.7 PG (ref 26.8–34.3)
MCHC RBC AUTO-ENTMCNC: 32.9 G/DL (ref 31.4–37.4)
MCV RBC AUTO: 84 FL (ref 82–98)
PLATELET # BLD AUTO: 244 THOUSANDS/UL (ref 149–390)
PMV BLD AUTO: 10.3 FL (ref 8.9–12.7)
POTASSIUM SERPL-SCNC: 3.9 MMOL/L (ref 3.5–5.3)
PROT SERPL-MCNC: 6.6 G/DL (ref 6.4–8.4)
RBC # BLD AUTO: 4.51 MILLION/UL (ref 3.88–5.62)
SODIUM SERPL-SCNC: 136 MMOL/L (ref 135–147)
WBC # BLD AUTO: 11.51 THOUSAND/UL (ref 4.31–10.16)

## 2023-08-18 PROCEDURE — 0T768DZ DILATION OF RIGHT URETER WITH INTRALUMINAL DEVICE, VIA NATURAL OR ARTIFICIAL OPENING ENDOSCOPIC: ICD-10-PCS | Performed by: UROLOGY

## 2023-08-18 PROCEDURE — BT1D1ZZ FLUOROSCOPY OF RIGHT KIDNEY, URETER AND BLADDER USING LOW OSMOLAR CONTRAST: ICD-10-PCS | Performed by: UROLOGY

## 2023-08-18 PROCEDURE — C1758 CATHETER, URETERAL: HCPCS | Performed by: UROLOGY

## 2023-08-18 PROCEDURE — 87040 BLOOD CULTURE FOR BACTERIA: CPT

## 2023-08-18 PROCEDURE — 99239 HOSP IP/OBS DSCHRG MGMT >30: CPT

## 2023-08-18 PROCEDURE — 87086 URINE CULTURE/COLONY COUNT: CPT | Performed by: UROLOGY

## 2023-08-18 PROCEDURE — 80053 COMPREHEN METABOLIC PANEL: CPT | Performed by: INTERNAL MEDICINE

## 2023-08-18 PROCEDURE — 52332 CYSTOSCOPY AND TREATMENT: CPT | Performed by: UROLOGY

## 2023-08-18 PROCEDURE — 85027 COMPLETE CBC AUTOMATED: CPT | Performed by: INTERNAL MEDICINE

## 2023-08-18 PROCEDURE — 99255 IP/OBS CONSLTJ NEW/EST HI 80: CPT | Performed by: UROLOGY

## 2023-08-18 PROCEDURE — C2617 STENT, NON-COR, TEM W/O DEL: HCPCS | Performed by: UROLOGY

## 2023-08-18 PROCEDURE — C1769 GUIDE WIRE: HCPCS | Performed by: UROLOGY

## 2023-08-18 PROCEDURE — 74420 UROGRAPHY RTRGR +-KUB: CPT

## 2023-08-18 DEVICE — INLAY OPTIMA URETERAL STENT W/O GUIDEWIRE
Type: IMPLANTABLE DEVICE | Site: URETER | Status: FUNCTIONAL
Brand: BARD® INLAY OPTIMA® URETERAL STENT

## 2023-08-18 RX ORDER — CEFTRIAXONE 2 G/50ML
2000 INJECTION, SOLUTION INTRAVENOUS EVERY 24 HOURS
Status: DISCONTINUED | OUTPATIENT
Start: 2023-08-18 | End: 2023-08-18 | Stop reason: HOSPADM

## 2023-08-18 RX ORDER — TAMSULOSIN HYDROCHLORIDE 0.4 MG/1
0.4 CAPSULE ORAL
Status: CANCELLED | OUTPATIENT
Start: 2023-08-18

## 2023-08-18 RX ORDER — ONDANSETRON 2 MG/ML
4 INJECTION INTRAMUSCULAR; INTRAVENOUS EVERY 6 HOURS PRN
Status: CANCELLED | OUTPATIENT
Start: 2023-08-18

## 2023-08-18 RX ORDER — PROPOFOL 10 MG/ML
INJECTION, EMULSION INTRAVENOUS AS NEEDED
Status: DISCONTINUED | OUTPATIENT
Start: 2023-08-18 | End: 2023-08-18

## 2023-08-18 RX ORDER — FENTANYL CITRATE/PF 50 MCG/ML
25 SYRINGE (ML) INJECTION
Status: DISCONTINUED | OUTPATIENT
Start: 2023-08-18 | End: 2023-08-18 | Stop reason: HOSPADM

## 2023-08-18 RX ORDER — FENTANYL CITRATE 50 UG/ML
100 INJECTION, SOLUTION INTRAMUSCULAR; INTRAVENOUS ONCE
Status: DISCONTINUED | OUTPATIENT
Start: 2023-08-18 | End: 2023-08-18 | Stop reason: HOSPADM

## 2023-08-18 RX ORDER — SENNOSIDES 8.6 MG
1 TABLET ORAL DAILY
Status: DISCONTINUED | OUTPATIENT
Start: 2023-08-19 | End: 2023-08-21

## 2023-08-18 RX ORDER — MAGNESIUM HYDROXIDE 1200 MG/15ML
LIQUID ORAL AS NEEDED
Status: DISCONTINUED | OUTPATIENT
Start: 2023-08-18 | End: 2023-08-18 | Stop reason: HOSPADM

## 2023-08-18 RX ORDER — SODIUM CHLORIDE, SODIUM LACTATE, POTASSIUM CHLORIDE, CALCIUM CHLORIDE 600; 310; 30; 20 MG/100ML; MG/100ML; MG/100ML; MG/100ML
125 INJECTION, SOLUTION INTRAVENOUS CONTINUOUS
Status: DISCONTINUED | OUTPATIENT
Start: 2023-08-18 | End: 2023-08-21 | Stop reason: HOSPADM

## 2023-08-18 RX ORDER — ONDANSETRON 2 MG/ML
INJECTION INTRAMUSCULAR; INTRAVENOUS AS NEEDED
Status: DISCONTINUED | OUTPATIENT
Start: 2023-08-18 | End: 2023-08-18

## 2023-08-18 RX ORDER — OXYCODONE HYDROCHLORIDE 5 MG/1
10 TABLET ORAL EVERY 4 HOURS PRN
Status: DISCONTINUED | OUTPATIENT
Start: 2023-08-18 | End: 2023-08-21 | Stop reason: HOSPADM

## 2023-08-18 RX ORDER — ONDANSETRON 2 MG/ML
4 INJECTION INTRAMUSCULAR; INTRAVENOUS EVERY 6 HOURS PRN
Status: DISCONTINUED | OUTPATIENT
Start: 2023-08-18 | End: 2023-08-21 | Stop reason: HOSPADM

## 2023-08-18 RX ORDER — SODIUM CHLORIDE 9 MG/ML
150 INJECTION, SOLUTION INTRAVENOUS CONTINUOUS
Status: DISCONTINUED | OUTPATIENT
Start: 2023-08-18 | End: 2023-08-18 | Stop reason: HOSPADM

## 2023-08-18 RX ORDER — DEXAMETHASONE SODIUM PHOSPHATE 10 MG/ML
INJECTION, SOLUTION INTRAMUSCULAR; INTRAVENOUS AS NEEDED
Status: DISCONTINUED | OUTPATIENT
Start: 2023-08-18 | End: 2023-08-18

## 2023-08-18 RX ORDER — TAMSULOSIN HYDROCHLORIDE 0.4 MG/1
0.4 CAPSULE ORAL
Status: DISCONTINUED | OUTPATIENT
Start: 2023-08-18 | End: 2023-08-21 | Stop reason: HOSPADM

## 2023-08-18 RX ORDER — OXYCODONE HYDROCHLORIDE 5 MG/1
5 TABLET ORAL EVERY 4 HOURS PRN
Status: DISCONTINUED | OUTPATIENT
Start: 2023-08-18 | End: 2023-08-21 | Stop reason: HOSPADM

## 2023-08-18 RX ORDER — SODIUM CHLORIDE, SODIUM LACTATE, POTASSIUM CHLORIDE, CALCIUM CHLORIDE 600; 310; 30; 20 MG/100ML; MG/100ML; MG/100ML; MG/100ML
INJECTION, SOLUTION INTRAVENOUS CONTINUOUS PRN
Status: DISCONTINUED | OUTPATIENT
Start: 2023-08-18 | End: 2023-08-18

## 2023-08-18 RX ORDER — SODIUM CHLORIDE 9 MG/ML
150 INJECTION, SOLUTION INTRAVENOUS CONTINUOUS
Status: CANCELLED | OUTPATIENT
Start: 2023-08-18

## 2023-08-18 RX ORDER — ACETAMINOPHEN 325 MG/1
650 TABLET ORAL EVERY 6 HOURS SCHEDULED
Status: DISCONTINUED | OUTPATIENT
Start: 2023-08-18 | End: 2023-08-21 | Stop reason: HOSPADM

## 2023-08-18 RX ORDER — SODIUM CHLORIDE, SODIUM LACTATE, POTASSIUM CHLORIDE, CALCIUM CHLORIDE 600; 310; 30; 20 MG/100ML; MG/100ML; MG/100ML; MG/100ML
100 INJECTION, SOLUTION INTRAVENOUS CONTINUOUS
Status: DISCONTINUED | OUTPATIENT
Start: 2023-08-18 | End: 2023-08-18

## 2023-08-18 RX ORDER — CEFTRIAXONE 2 G/50ML
2000 INJECTION, SOLUTION INTRAVENOUS EVERY 24 HOURS
Status: CANCELLED | OUTPATIENT
Start: 2023-08-19

## 2023-08-18 RX ORDER — OXYCODONE HYDROCHLORIDE 5 MG/1
5 TABLET ORAL EVERY 4 HOURS PRN
Status: CANCELLED | OUTPATIENT
Start: 2023-08-18

## 2023-08-18 RX ORDER — MIDAZOLAM HYDROCHLORIDE 2 MG/2ML
INJECTION, SOLUTION INTRAMUSCULAR; INTRAVENOUS AS NEEDED
Status: DISCONTINUED | OUTPATIENT
Start: 2023-08-18 | End: 2023-08-18

## 2023-08-18 RX ORDER — ACETAMINOPHEN 325 MG/1
650 TABLET ORAL EVERY 6 HOURS PRN
Status: CANCELLED | OUTPATIENT
Start: 2023-08-18

## 2023-08-18 RX ORDER — HYDROMORPHONE HCL/PF 1 MG/ML
0.5 SYRINGE (ML) INJECTION EVERY 2 HOUR PRN
Status: DISPENSED | OUTPATIENT
Start: 2023-08-18 | End: 2023-08-20

## 2023-08-18 RX ORDER — LIDOCAINE HYDROCHLORIDE 10 MG/ML
INJECTION, SOLUTION EPIDURAL; INFILTRATION; INTRACAUDAL; PERINEURAL AS NEEDED
Status: DISCONTINUED | OUTPATIENT
Start: 2023-08-18 | End: 2023-08-18

## 2023-08-18 RX ORDER — HYDROMORPHONE HCL IN WATER/PF 6 MG/30 ML
0.2 PATIENT CONTROLLED ANALGESIA SYRINGE INTRAVENOUS
Status: DISCONTINUED | OUTPATIENT
Start: 2023-08-18 | End: 2023-08-18 | Stop reason: HOSPADM

## 2023-08-18 RX ORDER — FENTANYL CITRATE 50 UG/ML
INJECTION, SOLUTION INTRAMUSCULAR; INTRAVENOUS AS NEEDED
Status: DISCONTINUED | OUTPATIENT
Start: 2023-08-18 | End: 2023-08-18

## 2023-08-18 RX ORDER — ONDANSETRON 2 MG/ML
4 INJECTION INTRAMUSCULAR; INTRAVENOUS ONCE AS NEEDED
Status: DISCONTINUED | OUTPATIENT
Start: 2023-08-18 | End: 2023-08-18 | Stop reason: HOSPADM

## 2023-08-18 RX ORDER — OXYBUTYNIN CHLORIDE 5 MG/1
5 TABLET ORAL 3 TIMES DAILY
Status: CANCELLED | OUTPATIENT
Start: 2023-08-18

## 2023-08-18 RX ORDER — ACETAMINOPHEN 325 MG/1
650 TABLET ORAL EVERY 6 HOURS PRN
Status: DISCONTINUED | OUTPATIENT
Start: 2023-08-18 | End: 2023-08-18 | Stop reason: HOSPADM

## 2023-08-18 RX ADMIN — LIDOCAINE HYDROCHLORIDE 50 MG: 10 INJECTION, SOLUTION EPIDURAL; INFILTRATION; INTRACAUDAL; PERINEURAL at 12:18

## 2023-08-18 RX ADMIN — ACETAMINOPHEN 650 MG: 325 TABLET, FILM COATED ORAL at 05:21

## 2023-08-18 RX ADMIN — PROPOFOL 200 MG: 10 INJECTION, EMULSION INTRAVENOUS at 12:18

## 2023-08-18 RX ADMIN — MORPHINE SULFATE 2 MG: 2 INJECTION, SOLUTION INTRAMUSCULAR; INTRAVENOUS at 04:16

## 2023-08-18 RX ADMIN — SODIUM CHLORIDE 150 ML/HR: 0.9 INJECTION, SOLUTION INTRAVENOUS at 05:13

## 2023-08-18 RX ADMIN — SODIUM CHLORIDE, SODIUM LACTATE, POTASSIUM CHLORIDE, AND CALCIUM CHLORIDE 125 ML/HR: .6; .31; .03; .02 INJECTION, SOLUTION INTRAVENOUS at 21:15

## 2023-08-18 RX ADMIN — TAMSULOSIN HYDROCHLORIDE 0.4 MG: 0.4 CAPSULE ORAL at 21:13

## 2023-08-18 RX ADMIN — ONDANSETRON 4 MG: 2 INJECTION INTRAMUSCULAR; INTRAVENOUS at 12:19

## 2023-08-18 RX ADMIN — SODIUM CHLORIDE, SODIUM LACTATE, POTASSIUM CHLORIDE, AND CALCIUM CHLORIDE: .6; .31; .03; .02 INJECTION, SOLUTION INTRAVENOUS at 12:12

## 2023-08-18 RX ADMIN — OXYCODONE HYDROCHLORIDE 5 MG: 5 TABLET ORAL at 08:21

## 2023-08-18 RX ADMIN — DEXAMETHASONE SODIUM PHOSPHATE 10 MG: 10 INJECTION, SOLUTION INTRAMUSCULAR; INTRAVENOUS at 12:19

## 2023-08-18 RX ADMIN — PROPOFOL 100 MG: 10 INJECTION, EMULSION INTRAVENOUS at 12:19

## 2023-08-18 RX ADMIN — SODIUM CHLORIDE, SODIUM LACTATE, POTASSIUM CHLORIDE, AND CALCIUM CHLORIDE 125 ML/HR: .6; .31; .03; .02 INJECTION, SOLUTION INTRAVENOUS at 14:37

## 2023-08-18 RX ADMIN — FENTANYL CITRATE 50 MCG: 50 INJECTION, SOLUTION INTRAMUSCULAR; INTRAVENOUS at 12:16

## 2023-08-18 RX ADMIN — CEFTRIAXONE 2000 MG: 2 INJECTION, SOLUTION INTRAVENOUS at 05:21

## 2023-08-18 RX ADMIN — MIDAZOLAM 2 MG: 1 INJECTION INTRAMUSCULAR; INTRAVENOUS at 12:09

## 2023-08-18 RX ADMIN — OXYCODONE HYDROCHLORIDE 5 MG: 5 TABLET ORAL at 22:32

## 2023-08-18 RX ADMIN — SODIUM CHLORIDE, SODIUM LACTATE, POTASSIUM CHLORIDE, AND CALCIUM CHLORIDE 150 ML/HR: .6; .31; .03; .02 INJECTION, SOLUTION INTRAVENOUS at 01:32

## 2023-08-18 RX ADMIN — FENTANYL CITRATE 50 MCG: 50 INJECTION, SOLUTION INTRAMUSCULAR; INTRAVENOUS at 11:06

## 2023-08-18 NOTE — ASSESSMENT & PLAN NOTE
Patient presented on 8/15 with flank pain and later discharged on medical management same day. Patient represented to [facility] 8/17 w/ right sided flank pain and nausea. Afebrile, leucocytosis resolved  UA unremarkable    Imaging:  CT abdomen (8/15)- right UVJ stone w/ moderate hydronephrosis.      Now s/p stent placement on 08/18; cloudy urine drained during procedure    Plan  · Continue ceftriaxone coverage day 3/5  · Urology following-discussed pain regimen due to acute L flank pain  · Continue IV hydration /hour  · IV pain management PRN

## 2023-08-18 NOTE — CONSULTS
CONSULT    Patient Name: Agnelica Wiseman  Patient MRN: [id number]  Date of Service: 8/18/2023   Date / Time Note Created: 8/18/2023 10:47 AM   Referring Provider: Jessica Torres MD    Provider Creating Note: DACIA Garcia    PCP: No primary care provider on file. Attending Provider:  Jessica Torres MD    Reason for Consult: Flank Pain    HPI:  Angelica Wiseman is a 28 y/o M with history of nephrolithiasis and spontaneous expulsion, not requiring previous formal urologic consultation or  surgical manipulation. He presented to [facility]  with chief complaint of R flank pain radiating to RLQ and thigh. He carries low grade fever and reported chills without nausea or vomiting , dysuria or gross hematuria. CT of the abdomen and pelvis obtained demonstrated 5 mm distal ureteral calculus, hydronephrosis. His labs showed + ALEJANDRINA. Therefore patient was transferred for urologic consultation. He is seen in OR preparation for intervention today. Of note, interview is language concordant. Active Problems:    Patient Active Problem List   Diagnosis   • Ureteric stone   • Acute kidney injury ([address])   • Leukocytosis            Impressions  · Right Ureteral Calculus  · Hydronephrosis  · Renal Colic  · ALEJANDRINA    Recommendations  1. Initiate aggressive IVFs   2. Flomax  3. Analgesia/Narcotics   4. Anti-emetics   5. ATBs empirically while awaiting culture   6. Strain urine   7. NPO for OR today. Explained risk, benefits and potential complications of ureteroscopic stone extraction. Patient has verbalized understanding of possible need for ureteral stent only for any signs of infection and/or technical difficult prohibiting stone retrieval etc.; requiring staged ureteroscopy electively once recovered and infection free as OP. Formal consent by surgeon. No past medical history on file. No past surgical history on file. No family history on file.     Social History Socioeconomic History   • Marital status: Single     Spouse name: Not on file   • Number of children: Not on file   • Years of education: Not on file   • Highest education level: Not on file   Occupational History   • Not on file   Tobacco Use   • Smoking status: Never   • Smokeless tobacco: Never   Vaping Use   • Vaping Use: Never used   Substance and Sexual Activity   • Alcohol use: Yes   • Drug use: Never   • Sexual activity: Not on file   Other Topics Concern   • Not on file   Social History Narrative   • Not on file     Social Determinants of Health     Financial Resource Strain: Not on file   Food Insecurity: Not on file   Transportation Needs: Not on file   Physical Activity: Not on file   Stress: Not on file   Social Connections: Not on file   Intimate Partner Violence: Not on file   Housing Stability: Not on file       Allergies   Allergen Reactions   • Cortisone Hives       Review of Systems  10 point review of systems negative except as noted in HPI  Constitutional:   positive for  - chills and fever  Cardiovascular:   no chest pain or dyspnea on exertion  Gastrointestinal:   positive for - abdominal pain  Genito-Urinary:   no dysuria, trouble voiding, or hematuria  Neurological:   no TIA or stroke symptoms   All others in 13 point ROS negative    Chart Review   Allergies, current medications, history, problem list    Vital Signs  There were no vitals taken for this visit.     Physical Exam  General appearance: alert and oriented, in no acute distress, appears stated age, cooperative and no distress  Head: Normocephalic, without obvious abnormality, atraumatic  Neck: no JVD and supple, symmetrical, trachea midline  Lungs: diminished breath sounds  Heart: regular rate and rhythm, S1, S2 normal, no murmur, click, rub or gallop  Abdomen: abnormal findings:  mild tenderness in the RLQ  Extremities: extremities normal, warm and well-perfused; no cyanosis, clubbing, or edema  Pulses: 2+ and symmetric  Neurologic: Grossly normal  No urinary drains     Laboratory Studies  Lab Results   Component Value Date    K 3.9 08/18/2023     08/18/2023    CO2 24 08/18/2023    CREATININE 2.02 (H) 08/18/2023    BUN 17 08/18/2023     Lab Results   Component Value Date    WBC 11.51 (H) 08/18/2023    RBC 4.51 08/18/2023    HGB 12.5 08/18/2023    HCT 38.0 08/18/2023    MCV 84 08/18/2023    MCH 27.7 08/18/2023    RDW 12.4 08/18/2023     08/18/2023         Imaging and Other Studies  ). CT renal stone study abdomen pelvis wo contrast    Result Date: 8/15/2023  Narrative: CT ABDOMEN AND PELVIS WITHOUT IV CONTRAST - LOW DOSE RENAL STONE INDICATION:   Flank pain, kidney stone suspected R flank pain. COMPARISON:  None. TECHNIQUE:  Low radiation dose thin section CT examination of the abdomen and pelvis was performed without intravenous or oral contrast according to a protocol specifically designed to evaluate for urinary tract calculus. Axial, sagittal, and coronal 2D  reformatted images were created from the source data and submitted for interpretation. Evaluation for pathology in the abdomen and pelvis that is unrelated to urinary tract calculi is limited. Radiation dose length product (DLP) for this visit:  231.5 mGy-cm . This examination, like all CT scans performed in the [facility], was performed utilizing techniques to minimize radiation dose exposure, including the use of iterative reconstruction and automated exposure control. FINDINGS LOWER CHEST:  No clinically significant abnormality identified in the visualized lower chest. LIVER/BILIARY TREE:  Unremarkable. GALLBLADDER:  No calcified gallstones. No pericholecystic inflammatory change. SPLEEN:  Unremarkable. PANCREAS:  Unremarkable. ADRENAL GLANDS:  Unremarkable. KIDNEYS/URETERS: Moderate right hydronephrosis. 5 x 3 mm stone at the right ureteropelvic junction.  There are additional bilateral stones measuring as large as 2 mm in each kidney STOMACH AND BOWEL:  Unremarkable. APPENDIX: A normal appendix was visualized. ABDOMINOPELVIC CAVITY:  No ascites or free intraperitoneal air. No lymphadenopathy. VESSELS:  Unremarkable for patient's age. PELVIS REPRODUCTIVE ORGANS:  Unremarkable for patient's age. URINARY BLADDER:  Unremarkable. ABDOMINAL WALL/INGUINAL REGIONS:  Unremarkable. OSSEOUS STRUCTURES:  No acute fracture or destructive osseous lesion. Impression: 1.  5 mm right UVJ stone causes moderate hydronephrosis 2. Additional bilateral nonobstructing renal stones Workstation performed: [id number]       Medications   Scheduled Meds:  Continuous Infusions:No current facility-administered medications for this encounter.     PRN Meds:.              Benji Remedies, CRNP

## 2023-08-18 NOTE — ASSESSMENT & PLAN NOTE
Likely reactive in setting of UVJ stone. Febrile to 100.8 degrees previously.   · Mild leucocytosis  · UA negative  · U Cx negative    S/p stent placement on 08/18; cloudy urine drained during procedure  Afebrile overnight    Plan  · Continue ceftriaxone coverage day 2/5  · B Cx - x24 hours, continue to trend None

## 2023-08-18 NOTE — OP NOTE
OPERATIVE REPORT  PATIENT NAME: Lashell Shore    :  1990  MRN: [id number]  Pt Location: [facility] CYSTO ROOM 01    SURGERY DATE: 2023    Surgeon(s) and Role:     * Rigoberto Lynn MD - Primary    Preop Diagnosis:  Acute kidney injury ([address]) [N17.9]  Ureteric stone [N20.1]    Post-Op Diagnosis Codes:     * Acute kidney injury ([address]) [N17.9]     * Ureteric stone [N20.1]    Procedure(s):  Right - CYSTOSCOPY RETROGRADE PYELOGRAM WITH INSERTION STENT URETERAL    Specimen(s):  ID Type Source Tests Collected by Time Destination   A : urine culture  Urine Urine, Cystoscopic URINE CULTURE Rigoberto Lynn MD 2023 1239        Estimated Blood Loss:   Minimal    Drains:  6 x 26 right double-J stent    Anesthesia Type:   General    Operative Indications:  Acute kidney injury ([address]) [N17.9]  Ureteric stone [N20.1]      Operative Findings:  1. Purulent urine with passage of wire, urine collected and aspirated from kidney for culture  2. Mild to moderate hydronephrosis  3.  6 x 26 double-J stent placed without string    Complications:   None    Procedure and Technique:  Lashell Shore is a 27y.o.-year-old male who presented and was found to have a obstructing right ureteral calculus. Risk and benefits of cystoscopy with right ureteral stent insertion were discussed and reviewed. The patient understands that I will not remove their stone at this time because active fevers and infection and that they will require interval ureteroscopy in the future. We discussed that ureteral stent cannot be consider permanent and will need to be removed or exchanged within 3-6 months. Informed consent was obtained using  service. The patient was brought to the operating room on 2023 . After the smooth induction of general LMA anesthesia, the patient was placed in the dorsal lithotomy position. His genitalia was prepped and draped in a sterile fashion.   Venodyne boots had been applied. Intravenous antibiotics were administered in the form of ceftriaxone within the last 24 hours. A timeout was performed with all members of the operative team confirming the patient's identity, procedure to be performed, and laterality of the case. A 24 American rigid cystoscope with 30° lens was inserted. The bladder was thoroughly inspected. Attention was focused on the right ureteral orifice.  fluoroscopy demonstrated a non-radioopaque stone. A Bard Solo Plus Wire was placed into the collecting system and passed proximal to the stone and cloudy-appearing urine came from the right ureteral orifice. I then advanced the 5 [place] open-ended catheter over the wire and into the right renal pelvis. The wire was removed and there was brisk hydronephrotic drip. This was collected and sent for culture. A gentle retrograde pyelogram was performed confirming mild to moderate hydronephrosis. There were no other filling defects identified. A wire was reinserted and placed into the upper pole calyx. A 6 American 26 double-J ureteral stent was then placed in the standard fashion. The proximal coil was appreciated in the right renal pelvis and the distal coil was visualized within the bladder. There was no string left in place. The bladder was emptied and the cystoscope was removed. 2% viscous lidocaine was placed per urethra. Overall the patient tolerated the procedure well and there were no complications. The patient was extubated in the operating room and transferred to the PACU in stable condition at the conclusion of the case.     Plan-surgery will be arranged after patient recovers from active infection in the outpatient setting    Patient Disposition:  PACU         SIGNATURE: Rigoberto Lynn MD  DATE: August 18, 2023  TIME: 12:40 PM

## 2023-08-18 NOTE — H&P
INTERNAL MEDICINE RESIDENCY ADMISSION H&P     Name: Dietrich Nageotte   Age & Sex: 27 y.o. male   MRN: [id number]  Unit/Bed#: PACU 09  Encounter: [id number]  Primary Care Provider: No primary care provider on file. Code Status: Prior  Admission Status: INPATIENT   Disposition: Patient requires MED/SURG    Admit to team: SOD Team A    ASSESSMENT/PLAN     Principal Problem:    Ureteric stone  Active Problems:    Acute kidney injury ([address])    Leukocytosis    Leukocytosis  Assessment & Plan  Likely reactive in setting of UVJ stone. Febrile to 100.8 degrees. · WBC trend: 10.73 (8/15) --> 14.33 (8/17) --> 11.51 (8/18)  · UA negative  · Awaiting blood culture and urine culture    Plan  · Continue ceftriaxone until culture results    Acute kidney injury [facility])  Assessment & Plan  ALEJANDRINA likely 2/2 obstructive uropathy. · Cre trend: 1.28 (8/15) --> 2.12 (8/17) --> 2.02 (8/18)  · UA negative    Plan  · Continue aggressive IV hydration (125 mL/hr LR)  · Avoid nephrotoxins and hypotension and monitor electrolytes and BUNs/creatinine    * Ureteric stone  Assessment & Plan  Patient presented on 8/15 with flank pain and later discharged on medical management same day. Patient represented to [facility] 8/17 w/ right sided flank pain and nausea. WBC 11.5    Imaging:  CT abdomen (8/15)- right UVJ stone w/ moderate hydronephrosis. Plan  · S/p stent placement  · Continue ceftriaxone until cultures result  · Urology following  · Blood cultures x2 done at [place] redraw all required at [facility]  · Continue IV hydration /hour  · IV pain management PRN        VTE Pharmacologic Prophylaxis: None, post-procedure  VTE Mechanical Prophylaxis: sequential compression device    CHIEF COMPLAINT   No chief complaint on file. HISTORY OF PRESENT ILLNESS     26 y/o male with PMH of nephrolithiasis presented [address] on 8/17 for right flank pain.  Patient states that over the past 4 days, has has developed worsening right flank pain with nausea and one episode of vomiting. He initially presented to [facility] ED on 8/15 with similar complaints and a CT abdomen was performed demonstrating a right UVJ 3x5 mm kidney stone causing moderating hydronephrosis. He was discharged home due to lack of urinary infection, but returned on  due to insufficient pain control with tylenol and ibuprofen. Denies dysuria, hematuria. At [facility], patient was found to have an ALEJANDRINA secondary to obstructive uropathy and was given aggressive IV hydration which is being continued. He was febrile to 100.8 with leukocytosis despite negative UA. She was then transferred to [address] [place] [address] and received stent placement by urology on . Culture still pending and patient will remain on ceftriaxone per urology. REVIEW OF SYSTEMS     Review of Systems   Constitutional: Positive for fever. Negative for fatigue. Respiratory: Negative for shortness of breath. Cardiovascular: Negative for chest pain, palpitations and leg swelling. Gastrointestinal: Negative for abdominal distention and abdominal pain. Genitourinary: Positive for flank pain. Neurological: Negative for weakness and headaches. OBJECTIVE   There were no vitals filed for this visit. Temperature:   Temp (24hrs), Av.6 °F (37 °C), Min:97.5 °F (36.4 °C), Max:100.8 °F (38.2 °C)       Intake & Output:  I/O     None        Weights: There is no height or weight on file to calculate BMI. Weight (last 2 days)     None        Physical Exam  Constitutional:       Appearance: Normal appearance. HENT:      Head: Normocephalic and atraumatic. Mouth/Throat:      Mouth: Mucous membranes are moist.      Pharynx: Oropharynx is clear. Cardiovascular:      Rate and Rhythm: Regular rhythm. Tachycardia present. Pulmonary:      Effort: Pulmonary effort is normal.      Breath sounds: Normal breath sounds. Abdominal:      General: Abdomen is flat. Bowel sounds are normal.   Musculoskeletal:      Right lower leg: No edema. Left lower leg: No edema. Neurological:      General: No focal deficit present. Mental Status: He is alert and oriented to person, place, and time. Mental status is at baseline. Psychiatric:         Mood and Affect: Mood normal.         Thought Content: Thought content normal.       PAST MEDICAL HISTORY   No past medical history on file. PAST SURGICAL HISTORY   No past surgical history on file. SOCIAL & FAMILY HISTORY     Social History     Substance and Sexual Activity   Alcohol Use Yes     Substance and Sexual Activity   Alcohol Use Yes        Substance and Sexual Activity   Drug Use Never     Social History     Tobacco Use   Smoking Status Never   Smokeless Tobacco Never     No family history on file. LABORATORY DATA     Labs: I have personally reviewed pertinent reports. Results from last 7 days   Lab Units 08/18/23 0448 08/17/23  1557 08/15/23  2040   WBC Thousand/uL 11.51* 14.33* 10.73*   HEMOGLOBIN g/dL 12.5 14.7 14.7   HEMATOCRIT % 38.0 43.9 44.5   PLATELETS Thousands/uL 244 283 324   NEUTROS PCT %  --  69  --    MONOS PCT %  --  9  --    MONO PCT %  --   --  4   EOS PCT %  --  0 0      Results from last 7 days   Lab Units 08/18/23 0448 08/17/23  1557 08/15/23  2040   POTASSIUM mmol/L 3.9 4.1 3.4*   CHLORIDE mmol/L 103 104 103   CO2 mmol/L 24 25 23   BUN mg/dL 17 18 13   CREATININE mg/dL 2.02* 2.12* 1.28   CALCIUM mg/dL 8.7 9.4 9.2   ALK PHOS U/L 47  --  55   ALT U/L 9  --  17   AST U/L 10*  --  16         Micro:  No results found for: "BLOODCX", "Harrison Lauth", "WOUNDCULT", "SPUTUMCULTUR"  IMAGING & DIAGNOSTIC TESTS     Imaging: I have personally reviewed pertinent reports. No results found. EKG, Pathology, and Other Studies: I have personally reviewed pertinent reports.      ALLERGIES     Allergies   Allergen Reactions   • Cortisone Hives     MEDICATIONS PRIOR TO ARRIVAL     Prior to Admission medications Medication Sig Start Date End Date Taking? Authorizing Provider   acetaminophen (TYLENOL) 500 mg tablet Take 500 mg by mouth every 6 (six) hours as needed for mild pain    Historical Provider, MD   ibuprofen (MOTRIN) 400 mg tablet Take 400 mg by mouth every 6 (six) hours as needed for mild pain    Historical Provider, MD     MEDICATIONS ADMINISTERED IN LAST 24 HOURS     CURRENT MEDICATIONS     No current facility-administered medications for this encounter. Admission Time  I spent 30 minutes admitting the patient. This involved direct patient contact where I performed a full history and physical, reviewing previous records, and reviewing laboratory and other diagnostic studies. Portions of the record may have been created with voice recognition software. Occasional wrong word or "sound a like" substitutions may have occurred due to the inherent limitations of voice recognition software.   Read the chart carefully and recognize, using context, where substitutions have occurred.    ==  [address], [place]

## 2023-08-18 NOTE — ANESTHESIA PREPROCEDURE EVALUATION
Procedure:  CYSTOSCOPY RETROGRADE PYELOGRAM WITH INSERTION STENT URETERAL (Right: Bladder)    Relevant Problems   /RENAL   (+) Acute kidney injury [facility])        Physical Exam    Airway    Mallampati score: I  TM Distance: >3 FB  Neck ROM: full     Dental   No notable dental hx     Cardiovascular  Cardiovascular exam normal    Pulmonary  Pulmonary exam normal     Other Findings        Anesthesia Plan  ASA Score- 1     Anesthesia Type- general with ASA Monitors. Additional Monitors:   Airway Plan: LMA. Comment: No family hx issues with anesthesia. ?has had "some anesthesia" for dental surgery . Plan Factors-    Chart reviewed. Imaging results reviewed. Existing labs reviewed. Patient summary reviewed. Patient is not a current smoker. Induction- intravenous. Postoperative Plan- Plan for postoperative opioid use. Planned trial extubation    Informed Consent- Anesthetic plan and risks discussed with patient. I personally reviewed this patient with the CRNA. Discussed and agreed on the Anesthesia Plan with the CRNA. Paty Rubio

## 2023-08-18 NOTE — QUICK NOTE
Cameron 27year-old presenting to the ED with right flank pain for the past 4 days. Patient noted to have right UVJ 5 mm stone with moderate hydronephrosis on CT scan on 8/15/2023. Patient was admitted for medical expulsive therapy. Patient developed fever 100.8F this AM, and continued flank pain.      Plan:  - Transfer to B under [facility]  - Abx  - Patient added on for stent placement today  - Maintain NPO status

## 2023-08-18 NOTE — RESPIRATORY THERAPY NOTE
RT Protocol Note  Jorje Hernández 27 y.o. male MRN: [id number]  Unit/Bed#: FY8 872-01 Encounter: [id number]    Assessment    Principal Problem:    Ureteric stone  Active Problems:    Acute kidney injury ([address])    Leukocytosis      Home Pulmonary Medications:  None       History reviewed. No pertinent past medical history. Social History     Socioeconomic History    Marital status: Single     Spouse name: None    Number of children: None    Years of education: None    Highest education level: None   Occupational History    None   Tobacco Use    Smoking status: Never    Smokeless tobacco: Never   Vaping Use    Vaping Use: Never used   Substance and Sexual Activity    Alcohol use: Yes    Drug use: Never    Sexual activity: None   Other Topics Concern    None   Social History Narrative    None     Social Determinants of Health     Financial Resource Strain: Not on file   Food Insecurity: Not on file   Transportation Needs: Not on file   Physical Activity: Not on file   Stress: Not on file   Social Connections: Not on file   Intimate Partner Violence: Not on file   Housing Stability: Not on file       Subjective         Objective    Physical Exam:   Assessment Type: Assess only  General Appearance: Awake  Respiratory Pattern: Normal  Chest Assessment: Chest expansion symmetrical  Bilateral Breath Sounds: Clear    Vitals:  Blood pressure 135/82, pulse 84, temperature (!) 97.3 °F (36.3 °C), temperature source Oral, resp. rate 16, SpO2 97 %. Imaging and other studies: I have personally reviewed pertinent reports. Plan    Respiratory Plan: Discontinue Protocol        Resp Comments: Pt assessed per respiratory protocol order. Pt had a right cystoscopy retrograde pyleogram with insertiion sent ureteral procedure today. Pt has no pulm hx and takes no pulm meds at home. BS clear and no resp distress is present at this time. Will d/c from resp prot.

## 2023-08-18 NOTE — ASSESSMENT & PLAN NOTE
ALEJANDRINA likely 2/2 obstructive uropathy  No underlying CKD  creatinine 2.12-> 1.4-> 1.28  Plan  · Continue IV hydration with  ml/hr  · Daily BMP  · Avoid nephrotoxins and hypotension and monitor electrolytes and BUNs/creatinine

## 2023-08-18 NOTE — PLAN OF CARE
Problem: PAIN - ADULT  Goal: Verbalizes/displays adequate comfort level or baseline comfort level  Description: Interventions:  - Encourage patient to monitor pain and request assistance  - Assess pain using appropriate pain scale  - Administer analgesics based on type and severity of pain and evaluate response  - Implement non-pharmacological measures as appropriate and evaluate response  - Consider cultural and social influences on pain and pain management  - Notify physician/advanced practitioner if interventions unsuccessful or patient reports new pain  Outcome: Progressing     Problem: INFECTION - ADULT  Goal: Absence or prevention of progression during hospitalization  Description: INTERVENTIONS:  - Assess and monitor for signs and symptoms of infection  - Monitor lab/diagnostic results  - Monitor all insertion sites, i.e. indwelling lines, tubes, and drains  - Monitor endotracheal if appropriate and nasal secretions for changes in amount and color  - [place] appropriate cooling/warming therapies per order  - Administer medications as ordered  - Instruct and encourage patient and family to use good hand hygiene technique  - Identify and instruct in appropriate isolation precautions for identified infection/condition  Outcome: Progressing     Problem: DISCHARGE PLANNING  Goal: Discharge to home or other facility with appropriate resources  Description: INTERVENTIONS:  - Identify barriers to discharge w/patient and caregiver  - Arrange for needed discharge resources and transportation as appropriate  - Identify discharge learning needs (meds, wound care, etc.)  - Arrange for interpretive services to assist at discharge as needed  - Refer to Case Management Department for coordinating discharge planning if the patient needs post-hospital services based on physician/advanced practitioner order or complex needs related to functional status, cognitive ability, or social support system  Outcome: Progressing Problem: Knowledge Deficit  Goal: Patient/family/caregiver demonstrates understanding of disease process, treatment plan, medications, and discharge instructions  Description: Complete learning assessment and assess knowledge base.   Interventions:  - Provide teaching at level of understanding  - Provide teaching via preferred learning methods  Outcome: Progressing

## 2023-08-18 NOTE — QUICK NOTE
Patient spiked a fever at 100.8 °F.  Continues to have no specific source of infection however given recent leukocytosis and fever, will order blood cultures, give antipyretic, and empirically start IV antibiotics.

## 2023-08-18 NOTE — DISCHARGE INSTR - AVS FIRST PAGE
Your stone was not addressed during today's surgery and you will absolutely need to return for interval stone surgery. This next procedure will be coordinated by our office. Please be aware that failure to followup can result in dangerous complication as the ureteral stent cannot remain in place indefinitely. After your surgery today, a stent was left coiled in your kidney/ureter/bladder. This tube will help the area to safely heal and urine to drain unobstructed. The stent placed is not permanent and cannot be left in your body indefinitely it will be exchanged at the time of your next surgery and then removed after. It is common to have some mild symptoms while the stent is in place. Pain with urination, feeling of needing to urinate frequency or urgently, flank discomfort or blood in the urine. Utilize the medications provided (flomax, ditropan, pyridium) as well as ibuprofen/motrin for pain control. Hydrate liberally with oral fluids. If there are issues at home, our office should be notified at [phone].

## 2023-08-18 NOTE — DISCHARGE SUMMARY
[address] [facility]  Discharge- Lillaim Ball 1990, 27 y.o. male MRN: [id number]  Unit/Bed#: MS [facility] Encounter: [id number]  Primary Care Provider: No primary care provider on file. Date and time admitted to hospital: 8/17/2023  3:40 PM    * Ureteric stone  Assessment & Plan  · Patient presents with right flank pain associated with nausea. · CAT scan of the abdomen was reviewed shows right UVJ stone 5 mm, also moderate hydronephrosis,  · Plan of care was discussed by the ED physician with the urologist who recommended that will give aggressive IV fluids and pain management and strain urine. · Over the course the night the patient developed a fever and continued to have leukocytosis, blood cultures were drawn, IV antibiotics were started, and antipyretics were initiated  · Urology reviewed the case and determined the patient needs to be sent to [facility] for a stent    Leukocytosis  Assessment & Plan  · Reactive secondary to right flank pain and UVJ stone. Monitor for any fever spike consider antibiotic if febrile or no improvement. · UA did not indicate UTI  · Had a fever of 100.8 degrees F, obtain blood cultures, started ceftriaxone, will be sent to [facility] for additional care    Acute kidney injury [facility])  Assessment & Plan  · ALEJANDRINA with obstructive uropathy, CT of the abdomen shows right UVJ 5 mm stone with moderate hydronephrosis, will give aggressive IV hydration and strain urine and check bladder scan. Will also start Flomax and oxybutynin. · Patient has also been taking ibuprofen/Motrin for the past 4 days.   · Will check urine eosinophils,  · Avoid nephrotoxins and hypotension and monitor electrolytes and BUNs/creatinine  · We will need to be sent to [facility] for cystoscopy and stent placement    Medical Problems     Resolved Problems  Date Reviewed: 8/17/2023   None       Discharging Physician / Practitioner: Edmond Munoz PA-C  PCP: No primary care provider on file.  Admission Date:   Admission Orders (From admission, onward)     Ordered        08/17/23 1741  Place in Observation  Once                      Discharge Date: 08/18/23    Consultations During Hospital Stay:  · Urology    Procedures Performed:   CT renal stone study abdomen pelvis wo contrast  Impression: 1.  5 mm right UVJ stone causes moderate hydronephrosis 2. Additional bilateral nonobstructing renal stones      Significant Findings / Test Results:   · WBC leukocytic  · Creatinine 2.12  · UA did not indicate UTI    Incidental Findings:   · None      Test Results Pending at Discharge (will require follow up):   · CMP, blood cultures     Outpatient Tests Requested:  · To be determined    Complications: None    Reason for Admission: [address] Course:   Kalani Reyez is a 27 y.o. male patient who originally presented to the hospital on 8/17/2023 due to right flank pain for the previous 4 days prior to admission. The patient had nausea and one episode of emesis. While in the ED a CT of the abdomen was conducted which noted UVJ had a right 5 mm stone with moderate hydronephrosis. The patient then received IV morphine and IV fluid boluses. Urology was consulted and recommended aggressive IVF and to monitor renal function. The patient was placed on n.p.o. over the course of the night, the patient developed a fever at 100.8 °F.  IV antibiotics and antipyretics were initiated as well as blood cultures were obtained. Urology recommended the patient should be transferred to [facility] for a stent and further urologic work-up. Please see above list of diagnoses and related plan for additional information. Condition at Discharge: fair    Discharge Day Visit / Exam:   Subjective: Patient reports feeling nauseous and chills during the night, however after Tylenol he felt significantly improved. The patient was notified about the transfer and urologic procedure.   He was agreeable to transfer to [facility] for further care. Vitals: Blood Pressure: 131/82 (08/18/23 0451)  Pulse: 86 (08/18/23 0451)  Temperature: (!) 100.8 °F (38.2 °C) (08/18/23 0451)  Temp Source: Tympanic (08/18/23 0451)  Respirations: 18 (08/18/23 0451)  Height: 5' 7" (170.2 cm) (08/17/23 1845)  Weight - Scale: 80.4 kg (177 lb 3.2 oz) (08/17/23 1845)  SpO2: 98 % (08/17/23 2317)  Exam:   Physical Exam  Vitals and nursing note reviewed. Constitutional:       Appearance: He is normal weight. HENT:      Head: Normocephalic. Nose: Nose normal.      Mouth/Throat:      Mouth: Mucous membranes are moist.      Pharynx: Oropharynx is clear. Eyes:      General: No scleral icterus. Conjunctiva/sclera: Conjunctivae normal.      Pupils: Pupils are equal, round, and reactive to light. Cardiovascular:      Rate and Rhythm: Normal rate and regular rhythm. Heart sounds: No murmur heard. No friction rub. No gallop. Pulmonary:      Effort: Pulmonary effort is normal. No respiratory distress. Breath sounds: Normal breath sounds. No stridor. No wheezing, rhonchi or rales. Abdominal:      General: Abdomen is flat. Palpations: Abdomen is soft. Tenderness: There is right CVA tenderness. There is no left CVA tenderness. Musculoskeletal:         General: Normal range of motion. Cervical back: Normal range of motion and neck supple. Right lower leg: No edema. Left lower leg: No edema. Lymphadenopathy:      Cervical: No cervical adenopathy. Skin:     General: Skin is warm. Coloration: Skin is not jaundiced or pale. Findings: No bruising, erythema or lesion. Neurological:      General: No focal deficit present. Mental Status: He is alert and oriented to person, place, and time. Mental status is at baseline. Cranial Nerves: No cranial nerve deficit. Motor: No weakness.    Psychiatric:         Mood and Affect: Mood normal.         Behavior: Behavior normal. Thought Content: Thought content normal.          Discussion with Family: Patient declined call to . Discharge instructions/Information to patient and family:   See after visit summary for information provided to patient and family. Provisions for Follow-Up Care:  See after visit summary for information related to follow-up care and any pertinent home health orders. Disposition:   [address] Transfer to [address] [place] [address]    Planned Readmission: Yes     Discharge Statement:  I spent 45 minutes discharging the patient. This time was spent on the day of discharge. I had direct contact with the patient on the day of discharge. Greater than 50% of the total time was spent examining patient, answering all patient questions, arranging and discussing plan of care with patient as well as directly providing post-discharge instructions. Additional time then spent on discharge activities. Discharge Medications:  See after visit summary for reconciled discharge medications provided to patient and/or family.       **Please Note: This note may have been constructed using a voice recognition system**

## 2023-08-19 PROBLEM — N13.2 URETERAL STONE WITH HYDRONEPHROSIS: Status: ACTIVE | Noted: 2023-08-17

## 2023-08-19 LAB
ANION GAP SERPL CALCULATED.3IONS-SCNC: 6 MMOL/L
BUN SERPL-MCNC: 19 MG/DL (ref 5–25)
CALCIUM SERPL-MCNC: 9.3 MG/DL (ref 8.3–10.1)
CHLORIDE SERPL-SCNC: 109 MMOL/L (ref 96–108)
CO2 SERPL-SCNC: 25 MMOL/L (ref 21–32)
CREAT SERPL-MCNC: 1.42 MG/DL (ref 0.6–1.3)
ERYTHROCYTE [DISTWIDTH] IN BLOOD BY AUTOMATED COUNT: 12.4 % (ref 11.6–15.1)
GFR SERPL CREATININE-BSD FRML MDRD: 63 ML/MIN/1.73SQ M
GLUCOSE SERPL-MCNC: 116 MG/DL (ref 65–140)
HCT VFR BLD AUTO: 39.4 % (ref 36.5–49.3)
HGB BLD-MCNC: 13 G/DL (ref 12–17)
MCH RBC QN AUTO: 27.4 PG (ref 26.8–34.3)
MCHC RBC AUTO-ENTMCNC: 33 G/DL (ref 31.4–37.4)
MCV RBC AUTO: 83 FL (ref 82–98)
PLATELET # BLD AUTO: 269 THOUSANDS/UL (ref 149–390)
PMV BLD AUTO: 10.1 FL (ref 8.9–12.7)
POTASSIUM SERPL-SCNC: 4.1 MMOL/L (ref 3.5–5.3)
RBC # BLD AUTO: 4.74 MILLION/UL (ref 3.88–5.62)
SODIUM SERPL-SCNC: 140 MMOL/L (ref 135–147)
WBC # BLD AUTO: 14.27 THOUSAND/UL (ref 4.31–10.16)

## 2023-08-19 PROCEDURE — 99223 1ST HOSP IP/OBS HIGH 75: CPT | Performed by: INTERNAL MEDICINE

## 2023-08-19 PROCEDURE — 85027 COMPLETE CBC AUTOMATED: CPT | Performed by: UROLOGY

## 2023-08-19 PROCEDURE — 80048 BASIC METABOLIC PNL TOTAL CA: CPT | Performed by: UROLOGY

## 2023-08-19 RX ORDER — PHENAZOPYRIDINE HYDROCHLORIDE 100 MG/1
100 TABLET, FILM COATED ORAL 3 TIMES DAILY PRN
Status: DISCONTINUED | OUTPATIENT
Start: 2023-08-19 | End: 2023-08-20

## 2023-08-19 RX ADMIN — SODIUM CHLORIDE, SODIUM LACTATE, POTASSIUM CHLORIDE, AND CALCIUM CHLORIDE 125 ML/HR: .6; .31; .03; .02 INJECTION, SOLUTION INTRAVENOUS at 15:59

## 2023-08-19 RX ADMIN — HYDROMORPHONE HYDROCHLORIDE 0.5 MG: 1 INJECTION, SOLUTION INTRAMUSCULAR; INTRAVENOUS; SUBCUTANEOUS at 22:57

## 2023-08-19 RX ADMIN — SODIUM CHLORIDE, SODIUM LACTATE, POTASSIUM CHLORIDE, AND CALCIUM CHLORIDE 125 ML/HR: .6; .31; .03; .02 INJECTION, SOLUTION INTRAVENOUS at 23:52

## 2023-08-19 RX ADMIN — CEFTRIAXONE SODIUM 1000 MG: 10 INJECTION, POWDER, FOR SOLUTION INTRAVENOUS at 05:43

## 2023-08-19 RX ADMIN — TAMSULOSIN HYDROCHLORIDE 0.4 MG: 0.4 CAPSULE ORAL at 20:47

## 2023-08-19 RX ADMIN — SENNOSIDES 8.6 MG: 8.6 TABLET, FILM COATED ORAL at 09:56

## 2023-08-19 RX ADMIN — ACETAMINOPHEN 650 MG: 325 TABLET, FILM COATED ORAL at 22:12

## 2023-08-19 RX ADMIN — ACETAMINOPHEN 650 MG: 325 TABLET, FILM COATED ORAL at 12:18

## 2023-08-19 RX ADMIN — SODIUM CHLORIDE, SODIUM LACTATE, POTASSIUM CHLORIDE, AND CALCIUM CHLORIDE 125 ML/HR: .6; .31; .03; .02 INJECTION, SOLUTION INTRAVENOUS at 05:49

## 2023-08-19 RX ADMIN — ACETAMINOPHEN 650 MG: 325 TABLET, FILM COATED ORAL at 17:55

## 2023-08-19 RX ADMIN — ACETAMINOPHEN 650 MG: 325 TABLET, FILM COATED ORAL at 05:43

## 2023-08-19 NOTE — PLAN OF CARE
Problem: PAIN - ADULT  Goal: Verbalizes/displays adequate comfort level or baseline comfort level  Description: Interventions:  - Encourage patient to monitor pain and request assistance  - Assess pain using appropriate pain scale  - Administer analgesics based on type and severity of pain and evaluate response  - Implement non-pharmacological measures as appropriate and evaluate response  - Consider cultural and social influences on pain and pain management  - Notify physician/advanced practitioner if interventions unsuccessful or patient reports new pain  Outcome: Progressing     Problem: INFECTION - ADULT  Goal: Absence or prevention of progression during hospitalization  Description: INTERVENTIONS:  - Assess and monitor for signs and symptoms of infection  - Monitor lab/diagnostic results  - Monitor all insertion sites, i.e. indwelling lines, tubes, and drains  - Monitor endotracheal if appropriate and nasal secretions for changes in amount and color  - [place] appropriate cooling/warming therapies per order  - Administer medications as ordered  - Instruct and encourage patient and family to use good hand hygiene technique  - Identify and instruct in appropriate isolation precautions for identified infection/condition  Outcome: Progressing  Goal: Absence of fever/infection during neutropenic period  Description: INTERVENTIONS:  - Monitor WBC    Outcome: Progressing     Problem: SAFETY ADULT  Goal: Patient will remain free of falls  Description: INTERVENTIONS:  - Educate patient/family on patient safety including physical limitations  - Instruct patient to call for assistance with activity   - Consult OT/PT to assist with strengthening/mobility   - Keep Call bell within reach  - Keep bed low and locked with side rails adjusted as appropriate  - Keep care items and personal belongings within reach  - Initiate and maintain comfort rounds  - Make Fall Risk Sign visible to staff  - Offer Toileting every 4 Hours, in advance of need  - Initiate/Maintain 4alarm  - Obtain necessary fall risk management equipment: 4  - Apply yellow socks and bracelet for high fall risk patients  - Consider moving patient to room near nurses station  Outcome: Progressing  Goal: Maintain or return to baseline ADL function  Description: INTERVENTIONS:  -  Assess patient's ability to carry out ADLs; assess patient's baseline for ADL function and identify physical deficits which impact ability to perform ADLs (bathing, care of mouth/teeth, toileting, grooming, dressing, etc.)  - Assess/evaluate cause of self-care deficits   - Assess range of motion  - Assess patient's mobility; develop plan if impaired  - Assess patient's need for assistive devices and provide as appropriate  - Encourage maximum independence but intervene and supervise when necessary  - Involve family in performance of ADLs  - Assess for home care needs following discharge   - Consider OT consult to assist with ADL evaluation and planning for discharge  - Provide patient education as appropriate  Outcome: Progressing  Goal: Maintains/Returns to pre admission functional level  Description: INTERVENTIONS:  - Perform BMAT or MOVE assessment daily.   - Set and communicate daily mobility goal to care team and patient/family/caregiver. - Collaborate with rehabilitation services on mobility goals if consulted  - Perform Range of Motion 4 times a day. - Reposition patient every 4 hours.   - Dangle patient 4 times a day  - Stand patient 4 times a day  - Ambulate patient 4 times a day  - Out of bed to chair 4 times a day   - Out of bed for meals 4 times a day  - Out of bed for toileting  - Record patient progress and toleration of activity level   Outcome: Progressing     Problem: Knowledge Deficit  Goal: Patient/family/caregiver demonstrates understanding of disease process, treatment plan, medications, and discharge instructions  Description: Complete learning assessment and assess knowledge base.   Interventions:  - Provide teaching at level of understanding  - Provide teaching via preferred learning methods  Outcome: Progressing     Problem: DISCHARGE PLANNING  Goal: Discharge to home or other facility with appropriate resources  Description: INTERVENTIONS:  - Identify barriers to discharge w/patient and caregiver  - Arrange for needed discharge resources and transportation as appropriate  - Identify discharge learning needs (meds, wound care, etc.)  - Arrange for interpretive services to assist at discharge as needed  - Refer to Case Management Department for coordinating discharge planning if the patient needs post-hospital services based on physician/advanced practitioner order or complex needs related to functional status, cognitive ability, or social support system  Outcome: Progressing

## 2023-08-19 NOTE — QUICK NOTE
Patient seen and examined today. He is having some mild upper back and right CVA tenderness along with dysuria and penile pain with urination. Using  services, we discussed that yesterday's procedure went well. Patient has a decompressing stent. Given his fevers, stone was not removed. Patient understands he will require secondary procedure. He understands the symptoms of dysuria, penile pain, frequency/urgency and flank pressure are related to the indwelling stent. Would continue Flomax and as acute kidney injury is improving, can consider adding Pyridium as needed today. Patient's preliminary blood cultures are no growth in the urine culture from yesterday is also without evidence of infection. Creatinine is resolving from acute kidney injury with creatinine above 2. Today 1.4. Mild reactive leukocytosis today of 14,000 which I suspect is postsurgical.  If the patient continues to do well, I expect he should be able to be discharged in the next 24 hours. Urology will plan to sign off, outpatient stage secondary surgery will be coordinated, please contact us with any questions.     /82 (BP Location: Right arm)   Pulse 84   Temp 98.2 °F (36.8 °C) (Oral)   Resp 16   SpO2 99%      Lab Results   Component Value Date    WBC 14.27 (H) 08/19/2023    HGB 13.0 08/19/2023    HCT 39.4 08/19/2023    MCV 83 08/19/2023     08/19/2023     Lab Results   Component Value Date    SODIUM 140 08/19/2023    K 4.1 08/19/2023     (H) 08/19/2023    CO2 25 08/19/2023    BUN 19 08/19/2023    CREATININE 1.42 (H) 08/19/2023    GLUC 116 08/19/2023    CALCIUM 9.3 08/19/2023

## 2023-08-19 NOTE — PROGRESS NOTES
INTERNAL MEDICINE RESIDENCY PROGRESS NOTE     Name: Chauncey Rosenthal   Age & Sex: 27 y.o. male   MRN: [id number]  Unit/Bed#: GQ7 872-01   Encounter: [id number]  Team: SOD Team A    PATIENT INFORMATION     Name: Chauncye Rosenthal   Age & Sex: 27 y.o. male   MRN: [id number]  Hospital Stay Days: 1    ASSESSMENT/PLAN     Principal Problem:    Ureteral stone with hydronephrosis  Active Problems:    Acute kidney injury ([address])    Leukocytosis      * Ureteral stone with hydronephrosis  Assessment & Plan  Patient presented on 8/15 with flank pain and later discharged on medical management same day. Patient represented to [facility] 8/17 w/ right sided flank pain and nausea. Afebrile, mild leucocytosis  UA unremarkable    Imaging:  CT abdomen (8/15)- right UVJ stone w/ moderate hydronephrosis. Now s/p stent placement on 08/18; cloudy urine drained during procedure    Plan  · Continue ceftriaxone coverage day 2/5  · Follow up urine cultures  · Urology following  · Continue IV hydration /hour  · IV pain management PRN      Acute kidney injury ([address])  Assessment & Plan  ALEJANDRINA likely 2/2 obstructive uropathy  No underlying CKD  Presented with creatinine 2.12; now improved to 1.4 with stent placement and IV fluids    Plan  · Continue IV hydration with  ml/hr  · Daily BMP  · Avoid nephrotoxins and hypotension and monitor electrolytes and BUNs/creatinine    Leukocytosis  Assessment & Plan  Likely reactive in setting of UVJ stone. Febrile to 100.8 degrees previously. · Mild leucocytosis  · UA negative    S/p stent placement on 08/18; cloudy urine drained during procedure  Afebrile overnight    Plan  · Continue ceftriaxone coverage day 2/5  · Follow up urine and blood cultures      Disposition: Inpatient level care for IV fluids, antibiotics and pain management; anticipate discharge in 24-48 hours    SUBJECTIVE     Patient seen and examined. No acute events overnight.   Patient mentioned that he still has right flank pain and painful urination. Otherwise feels well and better than yesterday. No fever, chills, abdominal pain, nausea, vomiting, diarrhea, constipation, abdominal pain. Eating and drinking well. Adequate urine output and bowel movements. OBJECTIVE     Vitals:    23 1430 23 1500 23 2300 23 0854   BP: 141/92 135/82 141/85 151/82   BP Location:  Right arm Right arm Right arm   Pulse: 92 84 98 84   Resp: 16  15 16   Temp: (!) 97.3 °F (36.3 °C) (!) 97.3 °F (36.3 °C) 98.5 °F (36.9 °C) 98.2 °F (36.8 °C)   TempSrc:  Oral Oral Oral   SpO2: 97% 97% 97% 99%      Temperature:   Temp (24hrs), Av.1 °F (36.7 °C), Min:97.3 °F (36.3 °C), Max:98.6 °F (37 °C)    Temperature: 98.2 °F (36.8 °C)  Intake & Output:  I/O        0701   0700  0701   0700  0701   0700    P. O.  480     I.V.  1900     Total Intake  2380     Urine  1800     Total Output  1800     Net  +580                Weights: There is no height or weight on file to calculate BMI. Weight (last 2 days)     None        Physical Exam  Vitals and nursing note reviewed. Constitutional:       General: He is not in acute distress. Appearance: Normal appearance. HENT:      Mouth/Throat:      Mouth: Mucous membranes are moist.   Eyes:      Pupils: Pupils are equal, round, and reactive to light. Cardiovascular:      Rate and Rhythm: Normal rate and regular rhythm. Pulses: Normal pulses. Heart sounds: Normal heart sounds. Pulmonary:      Effort: Pulmonary effort is normal.      Breath sounds: Normal breath sounds. Abdominal:      General: Bowel sounds are normal. There is no distension. Palpations: Abdomen is soft. Tenderness: There is no abdominal tenderness. There is right CVA tenderness. There is no left CVA tenderness. Musculoskeletal:      Right lower leg: No edema. Left lower leg: No edema. Skin:     General: Skin is warm.       Capillary Refill: Capillary refill takes less than 2 seconds. Coloration: Skin is not jaundiced or pale. Neurological:      General: No focal deficit present. Mental Status: He is alert and oriented to person, place, and time. Psychiatric:         Mood and Affect: Mood normal.         Behavior: Behavior normal.       LABORATORY DATA     Labs: I have personally reviewed pertinent reports. Results from last 7 days   Lab Units 08/19/23  0554 08/18/23  0448 08/17/23  1557 08/15/23  2040   WBC Thousand/uL 14.27* 11.51* 14.33* 10.73*   HEMOGLOBIN g/dL 13.0 12.5 14.7 14.7   HEMATOCRIT % 39.4 38.0 43.9 44.5   PLATELETS Thousands/uL 269 244 283 324   NEUTROS PCT %  --   --  69  --    MONOS PCT %  --   --  9  --    MONO PCT %  --   --   --  4   EOS PCT %  --   --  0 0      Results from last 7 days   Lab Units 08/19/23  0554 08/18/23  0448 08/17/23  1557 08/15/23  2040   POTASSIUM mmol/L 4.1 3.9 4.1 3.4*   CHLORIDE mmol/L 109* 103 104 103   CO2 mmol/L 25 24 25 23   BUN mg/dL 19 17 18 13   CREATININE mg/dL 1.42* 2.02* 2.12* 1.28   CALCIUM mg/dL 9.3 8.7 9.4 9.2   ALK PHOS U/L  --  47  --  55   ALT U/L  --  9  --  17   AST U/L  --  10*  --  16                            IMAGING & DIAGNOSTIC TESTING     Radiology Results: I have personally reviewed pertinent reports. FL retrograde pyelogram    Result Date: 8/18/2023  Impression: Fluoroscopic guidance provided for right retrograde pyelogram. Please see procedure report for further details. Workstation performed: [id number]     Other Diagnostic Testing: I have personally reviewed pertinent reports.     ACTIVE MEDICATIONS     Current Facility-Administered Medications   Medication Dose Route Frequency   • acetaminophen (TYLENOL) tablet 650 mg  650 mg Oral Q6H [address]   • cefTRIAXone (ROCEPHIN) 1,000 mg in dextrose 5 % 50 mL IVPB  1,000 mg Intravenous Q24H   • HYDROmorphone (DILAUDID) injection 0.5 mg  0.5 mg Intravenous Q2H PRN   • lactated ringers bolus 1,000 mL  1,000 mL Intravenous Once PRN    And   • lactated ringers bolus 1,000 mL  1,000 mL Intravenous Once PRN   • lactated ringers infusion  125 mL/hr Intravenous Continuous   • ondansetron (ZOFRAN) injection 4 mg  4 mg Intravenous Q6H PRN   • oxyCODONE (ROXICODONE) IR tablet 5 mg  5 mg Oral Q4H PRN    Or   • oxyCODONE (ROXICODONE) IR tablet 10 mg  10 mg Oral Q4H PRN   • senna (SENOKOT) tablet 8.6 mg  1 tablet Oral Daily   • sodium chloride 0.9 % bolus 1,000 mL  1,000 mL Intravenous Once PRN    And   • sodium chloride 0.9 % bolus 1,000 mL  1,000 mL Intravenous Once PRN   • tamsulosin (FLOMAX) capsule 0.4 mg  0.4 mg Oral HS       VTE Pharmacologic Prophylaxis: Reason for no pharmacologic prophylaxis low risk  VTE Mechanical Prophylaxis: sequential compression device    Portions of the record may have been created with voice recognition software. Occasional wrong word or "sound a like" substitutions may have occurred due to the inherent limitations of voice recognition software.   Read the chart carefully and recognize, using context, where substitutions have occurred.  ==  Tiffani Gomez MD  [facility]  Internal Medicine Residency PGY-2

## 2023-08-19 NOTE — UTILIZATION REVIEW
Initial Clinical Review    Admission: Date/Time/Statement:   Admission Orders (From admission, onward)     Ordered        08/18/23 1600  Inpatient Admission  Once                      Orders Placed This Encounter   Procedures   • Inpatient Admission     Standing Status:   Standing     Number of Occurrences:   1     Order Specific Question:   Level of Care     Answer:   Med Surg [16]     Order Specific Question:   Estimated length of stay     Answer:   More than 2 Midnights     Order Specific Question:   Certification     Answer:   I certify that inpatient services are medically necessary for this patient for a duration of greater than two midnights. See H&P and MD Progress Notes for additional information about the patient's course of treatment. Initial Presentation: 27 y.o. male to [facility] transferred form [facility]) where he initially presented on 8/17 with R flank pain x 4 days which progressively worsened, then developed n/v. W/u in [facility]) ED revealed a right UVJ 3x5 mm kidney stone causing moderating hydronephrosis on CT scan. He was d/c'd home after symptoms improved, but returned later on 8/17 d/t insufficient pain control with tylenol and ibuprofen at home. On return pt noted to to have ALEJANDRINA 2/2 obstructive uropathy and was given aggressive IVFs. He was febrile to 100.8 with leukocytosis despite negative UA. Tx'd to [facility] for urology eval and further tx. Tachycardic on presentation. ADMITTED INPATIENT to [facility] with UTERIC STONE, ALEJANDRINA -- Continue ceftriaxone. Ucx & blood cxs pending. Continue IVF @ 125 ml/hr. Avoid nephrotoxins and hypotension and monitor electrolytes and BUN/creatinine. Analgesics prn. Urology consulted with plan to to to OR for stent.     OPERATIVE REPORT  SURGERY DATE: 8/18/2023  Procedure(s):  Right - CYSTOSCOPY RETROGRADE PYELOGRAM WITH INSERTION STENT URETERAL  Anesthesia Type:   General   Operative Indications:  Acute kidney injury ([address]) [N17.9]  Ureteric stone [N20.1]   Operative Findings:  1. Purulent urine with passage of wire, urine collected and aspirated from kidney for culture  2. Mild to moderate hydronephrosis  3.  6 x 26 double-J stent placed without string      Date: 8/19   Day 2: s/p stent placement on 8/18 -- Pt stil with some R flank pain and painful urination, otherwise states feels better then yesterday. Denies n/v. Continue ceftriaxone IV. F/u cxs, still pending. Continue LR @ 125 ml/hr. Daily BMP. Reg diet. SCDs. OOB/ambulate. Analgesics prn.     ED Triage Vitals   Temperature Pulse Respirations Blood Pressure SpO2   08/18/23 1247 08/18/23 1247 08/18/23 1247 08/18/23 1247 08/18/23 1247   98.6 °F (37 °C) 102 16 132/69 91 %      Temp Source Heart Rate Source Patient Position - Orthostatic VS BP Location FiO2 (%)   08/18/23 1500 -- 08/18/23 1500 08/18/23 1500 08/18/23 1247   Oral  Lying Right arm 6      Pain Score       08/18/23 0929       6          Wt Readings from Last 1 Encounters:   08/17/23 80.4 kg (177 lb 3.2 oz)     Additional Vital Signs:   Date/Time Temp Pulse Resp BP MAP (mmHg) SpO2 FiO2 (%) Calculated FIO2 (%) - Nasal Cannula Nasal Cannula O2 Flow Rate (L/min) O2 Device Patient Position - Orthostatic VS   08/19/23 1500 98.3 °F (36.8 °C) 85 16 139/83 102 -- -- -- -- -- Lying   08/19/23 0854 98.2 °F (36.8 °C) 84 16 151/82 108 99 % -- -- -- -- Lying   08/18/23 2300 98.5 °F (36.9 °C) 98 15 141/85 108 97 % -- -- -- None (Room air) Lying   08/18/23 1500 97.3 °F (36.3 °C) Abnormal  84 -- 135/82 100 97 % -- -- -- -- Lying   08/18/23 1430 97.3 °F (36.3 °C) Abnormal  92 16 141/92 109 97 % -- -- -- None (Room air) --   08/18/23 1415 -- 88 16 132/93 104 96 % -- -- -- None (Room air) --   08/18/23 1400 -- 86 17 140/89 100 95 % -- -- -- -- --   08/18/23 1345 -- 92 22 131/86 97 95 % -- -- -- -- --   08/18/23 1330 98.5 °F (36.9 °C) 90 14 143/85 98 96 % -- 28 2 L/min Nasal cannula --   08/18/23 1315 98.5 °F (36.9 °C) 90 15 148/89 106 96 % -- 28 2 L/min Nasal cannula --   08/18/23 1300 -- 92 16 135/91 104 94 % -- -- -- -- --   08/18/23 1255 -- -- -- -- -- 94 % -- 28 2 L/min Nasal cannula --   08/18/23 1247 98.6 °F (37 °C) 102 16 132/69 87 91 % 6 -- -- Simple mask --     Pertinent Labs/Diagnostic Test Results:      CT a/p 8/15: 1.  5 mm right UVJ stone causes moderate hydronephrosis 2.   Additional bilateral nonobstructing renal stones     Results from last 7 days   Lab Units 08/19/23  0554 08/18/23  0448 08/17/23  1557 08/15/23  2040   WBC Thousand/uL 14.27* 11.51* 14.33* 10.73*   HEMOGLOBIN g/dL 13.0 12.5 14.7 14.7   HEMATOCRIT % 39.4 38.0 43.9 44.5   PLATELETS Thousands/uL 269 244 283 324   NEUTROS ABS Thousands/µL  --   --  9.82*  --      Results from last 7 days   Lab Units 08/19/23  0554 08/18/23  0448 08/17/23  1557 08/15/23  2040   SODIUM mmol/L 140 136 138 138   POTASSIUM mmol/L 4.1 3.9 4.1 3.4*   CHLORIDE mmol/L 109* 103 104 103   CO2 mmol/L 25 24 25 23   ANION GAP mmol/L 6 9 9 12   BUN mg/dL 19 17 18 13   CREATININE mg/dL 1.42* 2.02* 2.12* 1.28   EGFR ml/min/1.73sq m 63 41 39 72   CALCIUM mg/dL 9.3 8.7 9.4 9.2     Results from last 7 days   Lab Units 08/18/23  0448 08/15/23  2040   AST U/L 10* 16   ALT U/L 9 17   ALK PHOS U/L 47 55   TOTAL PROTEIN g/dL 6.6 7.9   ALBUMIN g/dL 3.7 4.6   TOTAL BILIRUBIN mg/dL 0.66 0.36     Results from last 7 days   Lab Units 08/19/23  0554 08/18/23  0448 08/17/23  1557 08/15/23  2040   GLUCOSE RANDOM mg/dL 116 88 92 150*     Results from last 7 days   Lab Units 08/17/23  1654 08/15/23  2150   CLARITY UA  Clear Clear   COLOR UA  Yellow Yellow   SPEC GRAV UA  1.020 >=1.030   PH UA  5.5 5.5   GLUCOSE UA mg/dl Negative Negative   KETONES UA mg/dl Negative Negative   BLOOD UA  Trace-Intact* Negative   PROTEIN UA mg/dl Negative Trace*   NITRITE UA  Negative Negative   BILIRUBIN UA  Negative Negative   UROBILINOGEN UA E.U./dl 0.2 0.2   LEUKOCYTES UA  Negative Negative   WBC UA /hpf None Seen 0-1   RBC UA /hpf None Seen None Seen   BACTERIA UA /hpf None Seen Occasional   EPITHELIAL CELLS WET PREP /hpf None Seen Occasional     Results from last 7 days   Lab Units 08/18/23  1554 08/18/23  1239 08/18/23  0715 08/18/23  0518   BLOOD CULTURE  Received in Microbiology Lab. Culture in Progress. Received in Microbiology Lab. Culture in Progress. --  No Growth at 24 hrs. No Growth at 24 hrs. URINE CULTURE   --  No Growth <100 cfu/mL  --   --        History reviewed. No pertinent past medical history. Present on Admission:  • Ureteral stone with hydronephrosis  • Acute kidney injury ([address])  • Leukocytosis      Admitting Diagnosis: Ureteric stone [N20.1]  Age/Sex: 27 y.o. male  Admission Orders:  Scheduled Medications:  acetaminophen, 650 mg, Oral, Q6H SILVIA  cefTRIAXone, 1,000 mg, Intravenous, Q24H  senna, 1 tablet, Oral, Daily  tamsulosin, 0.4 mg, Oral, HS    Continuous IV Infusions:  lactated ringers, 125 mL/hr, Intravenous, Continuous    PRN Meds:  HYDROmorphone, 0.5 mg, Intravenous, Q2H PRN  ondansetron, 4 mg, Intravenous, Q6H PRN  oxyCODONE, 5 mg, Oral, Q4H PRN   Or  oxyCODONE, 10 mg, Oral, Q4H PRN  phenazopyridine, 100 mg, Oral, TID PRN        Network Utilization Review Department  ATTENTION: Please call with any questions or concerns to [phone] and carefully listen to the prompts so that you are directed to the right person. All voicemails are confidential.  Cherylene Kelch all requests for admission clinical reviews, approved or denied determinations and any other requests to dedicated fax number below belonging to the campus where the patient is receiving treatment.  List of dedicated fax numbers for the Facilities:  [place] DENIALS (Administrative/Medical Necessity) [phone]   [facility] [address] [place] [address] [phone]   [facility] [phone]   [address] [address] [phone] [address] [facility] [address] [address] [facility] [address] [place] [address] [address]  [phone]

## 2023-08-20 ENCOUNTER — APPOINTMENT (OUTPATIENT)
Dept: RADIOLOGY | Facility: HOSPITAL | Age: 33
DRG: 465 | End: 2023-08-20
Payer: COMMERCIAL

## 2023-08-20 PROBLEM — D72.829 LEUKOCYTOSIS: Status: RESOLVED | Noted: 2023-08-17 | Resolved: 2023-08-20

## 2023-08-20 PROBLEM — N13.2 URETERAL STONE WITH HYDRONEPHROSIS: Status: RESOLVED | Noted: 2023-08-17 | Resolved: 2023-08-20

## 2023-08-20 PROBLEM — N17.9 ACUTE KIDNEY INJURY (HCC): Status: RESOLVED | Noted: 2023-08-17 | Resolved: 2023-08-20

## 2023-08-20 LAB
ANION GAP SERPL CALCULATED.3IONS-SCNC: 4 MMOL/L
BACTERIA BLD CULT: NORMAL
BACTERIA BLD CULT: NORMAL
BACTERIA UR CULT: NORMAL
BUN SERPL-MCNC: 19 MG/DL (ref 5–25)
CALCIUM SERPL-MCNC: 8.6 MG/DL (ref 8.3–10.1)
CHLORIDE SERPL-SCNC: 110 MMOL/L (ref 96–108)
CO2 SERPL-SCNC: 27 MMOL/L (ref 21–32)
CREAT SERPL-MCNC: 1.28 MG/DL (ref 0.6–1.3)
ERYTHROCYTE [DISTWIDTH] IN BLOOD BY AUTOMATED COUNT: 12.5 % (ref 11.6–15.1)
GFR SERPL CREATININE-BSD FRML MDRD: 72 ML/MIN/1.73SQ M
GLUCOSE SERPL-MCNC: 88 MG/DL (ref 65–140)
HCT VFR BLD AUTO: 36.6 % (ref 36.5–49.3)
HGB BLD-MCNC: 12 G/DL (ref 12–17)
MCH RBC QN AUTO: 27.5 PG (ref 26.8–34.3)
MCHC RBC AUTO-ENTMCNC: 32.8 G/DL (ref 31.4–37.4)
MCV RBC AUTO: 84 FL (ref 82–98)
PLATELET # BLD AUTO: 252 THOUSANDS/UL (ref 149–390)
PMV BLD AUTO: 9.7 FL (ref 8.9–12.7)
POTASSIUM SERPL-SCNC: 4.3 MMOL/L (ref 3.5–5.3)
RBC # BLD AUTO: 4.36 MILLION/UL (ref 3.88–5.62)
SODIUM SERPL-SCNC: 141 MMOL/L (ref 135–147)
WBC # BLD AUTO: 9.19 THOUSAND/UL (ref 4.31–10.16)

## 2023-08-20 PROCEDURE — 80048 BASIC METABOLIC PNL TOTAL CA: CPT

## 2023-08-20 PROCEDURE — 85027 COMPLETE CBC AUTOMATED: CPT

## 2023-08-20 PROCEDURE — 76775 US EXAM ABDO BACK WALL LIM: CPT

## 2023-08-20 PROCEDURE — 99232 SBSQ HOSP IP/OBS MODERATE 35: CPT | Performed by: INTERNAL MEDICINE

## 2023-08-20 RX ORDER — PHENAZOPYRIDINE HYDROCHLORIDE 100 MG/1
200 TABLET, FILM COATED ORAL
Status: DISCONTINUED | OUTPATIENT
Start: 2023-08-20 | End: 2023-08-21 | Stop reason: HOSPADM

## 2023-08-20 RX ORDER — KETOROLAC TROMETHAMINE 30 MG/ML
15 INJECTION, SOLUTION INTRAMUSCULAR; INTRAVENOUS EVERY 6 HOURS SCHEDULED
Status: COMPLETED | OUTPATIENT
Start: 2023-08-20 | End: 2023-08-20

## 2023-08-20 RX ADMIN — OXYBUTYNIN 15 MG: 10 TABLET, FILM COATED, EXTENDED RELEASE ORAL at 09:34

## 2023-08-20 RX ADMIN — ACETAMINOPHEN 650 MG: 325 TABLET, FILM COATED ORAL at 17:58

## 2023-08-20 RX ADMIN — ACETAMINOPHEN 650 MG: 325 TABLET, FILM COATED ORAL at 12:24

## 2023-08-20 RX ADMIN — KETOROLAC TROMETHAMINE 15 MG: 30 INJECTION, SOLUTION INTRAMUSCULAR; INTRAVENOUS at 12:24

## 2023-08-20 RX ADMIN — ACETAMINOPHEN 650 MG: 325 TABLET, FILM COATED ORAL at 23:27

## 2023-08-20 RX ADMIN — PHENAZOPYRIDINE 200 MG: 100 TABLET ORAL at 08:49

## 2023-08-20 RX ADMIN — CEFTRIAXONE SODIUM 1000 MG: 10 INJECTION, POWDER, FOR SOLUTION INTRAVENOUS at 05:11

## 2023-08-20 RX ADMIN — SODIUM CHLORIDE, SODIUM LACTATE, POTASSIUM CHLORIDE, AND CALCIUM CHLORIDE 125 ML/HR: .6; .31; .03; .02 INJECTION, SOLUTION INTRAVENOUS at 08:47

## 2023-08-20 RX ADMIN — TAMSULOSIN HYDROCHLORIDE 0.4 MG: 0.4 CAPSULE ORAL at 20:30

## 2023-08-20 RX ADMIN — ACETAMINOPHEN 650 MG: 325 TABLET, FILM COATED ORAL at 05:11

## 2023-08-20 RX ADMIN — PHENAZOPYRIDINE 200 MG: 100 TABLET ORAL at 16:48

## 2023-08-20 RX ADMIN — SENNOSIDES 8.6 MG: 8.6 TABLET, FILM COATED ORAL at 08:49

## 2023-08-20 RX ADMIN — HYDROMORPHONE HYDROCHLORIDE 0.5 MG: 1 INJECTION, SOLUTION INTRAMUSCULAR; INTRAVENOUS; SUBCUTANEOUS at 05:14

## 2023-08-20 RX ADMIN — KETOROLAC TROMETHAMINE 15 MG: 30 INJECTION, SOLUTION INTRAMUSCULAR; INTRAVENOUS at 17:58

## 2023-08-20 RX ADMIN — PHENAZOPYRIDINE 200 MG: 100 TABLET ORAL at 12:24

## 2023-08-20 RX ADMIN — KETOROLAC TROMETHAMINE 15 MG: 30 INJECTION, SOLUTION INTRAMUSCULAR; INTRAVENOUS at 23:25

## 2023-08-20 RX ADMIN — SODIUM CHLORIDE, SODIUM LACTATE, POTASSIUM CHLORIDE, AND CALCIUM CHLORIDE 125 ML/HR: .6; .31; .03; .02 INJECTION, SOLUTION INTRAVENOUS at 16:48

## 2023-08-20 RX ADMIN — KETOROLAC TROMETHAMINE 15 MG: 30 INJECTION, SOLUTION INTRAMUSCULAR; INTRAVENOUS at 08:48

## 2023-08-20 NOTE — PROGRESS NOTES
INTERNAL MEDICINE RESIDENCY PROGRESS NOTE     Name: Jordan Hayes   Age & Sex: 27 y.o. male   MRN: [id number]  Unit/Bed#: VD9 872-01   Encounter: [id number]  Team: SOD Team A    PATIENT INFORMATION     Name: Jordan Hayes   Age & Sex: 27 y.o. male   MRN: [id number]  Hospital Stay Days: 2    ASSESSMENT/PLAN     Principal Problem:    Ureteral stone with hydronephrosis  Active Problems:    Acute kidney injury ([address])    Leukocytosis      Leukocytosis  Assessment & Plan  Likely reactive in setting of UVJ stone. Febrile to 100.8 degrees previously. · Mild leucocytosis  · UA negative  · U Cx negative    S/p stent placement on 08/18; cloudy urine drained during procedure  Afebrile overnight    Plan  · Continue ceftriaxone coverage day 2/5  · B Cx - x24 hours, continue to trend    Acute kidney injury [facility])  Assessment & Plan  ALEJANDRINA likely 2/2 obstructive uropathy  No underlying CKD  creatinine 2.12-> 1.4-> 1.28  Plan  · Continue IV hydration with  ml/hr  · Daily BMP  · Avoid nephrotoxins and hypotension and monitor electrolytes and BUNs/creatinine    * Ureteral stone with hydronephrosis  Assessment & Plan  Patient presented on 8/15 with flank pain and later discharged on medical management same day. Patient represented to [facility] 8/17 w/ right sided flank pain and nausea. Afebrile, leucocytosis resolved  UA unremarkable    Imaging:  CT abdomen (8/15)- right UVJ stone w/ moderate hydronephrosis. Now s/p stent placement on 08/18; cloudy urine drained during procedure    Plan  · Continue ceftriaxone coverage day 3/5  · Urology following-discussed pain regimen due to acute L flank pain  · Continue IV hydration /hour  · IV pain management PRN        Disposition: D/c in next 24-48 hours     SUBJECTIVE     Patient seen and examined. No acute events overnight. Patient complaining of severe flank pain noted on L side, similar to presentation. Denies hematuria.  Patient stated he had a hard time sleeping due to the pain. OBJECTIVE     Vitals:    23 0854 23 1500 23 2300 23 0700   BP: 151/82 139/83 154/94 153/89   BP Location: Right arm Right arm Right arm Right arm   Pulse: 84 85 89 65   Resp: 16 16 16 15   Temp: 98.2 °F (36.8 °C) 98.3 °F (36.8 °C) 97.5 °F (36.4 °C) 98 °F (36.7 °C)   TempSrc: Oral Oral Oral Oral   SpO2: 99%  95% 95%      Temperature:   Temp (24hrs), Av °F (36.7 °C), Min:97.5 °F (36.4 °C), Max:98.3 °F (36.8 °C)    Temperature: 98 °F (36.7 °C)  Intake & Output:  I/O        07 0700  0701   07 0701   0700    P. O. 480      I.V. 1900 2256.3     Total Intake 2380 2256.3     Urine 1800      Total Output 1800      Net +580 +2256.3                Weights: There is no height or weight on file to calculate BMI. Weight (last 2 days)     None        Physical Exam  Vitals reviewed. Constitutional:       Appearance: Normal appearance. HENT:      Head: Normocephalic and atraumatic. Mouth/Throat:      Mouth: Mucous membranes are moist.      Pharynx: Oropharynx is clear. Cardiovascular:      Rate and Rhythm: Normal rate and regular rhythm. Pulmonary:      Effort: Pulmonary effort is normal.      Breath sounds: Normal breath sounds. Abdominal:      General: Abdomen is flat. Bowel sounds are normal.      Tenderness: There is right CVA tenderness. Musculoskeletal:      Right lower leg: No edema. Left lower leg: No edema. Skin:     General: Skin is warm and dry. Neurological:      Mental Status: He is alert and oriented to person, place, and time. Psychiatric:         Mood and Affect: Mood normal.         Behavior: Behavior normal.       LABORATORY DATA     Labs: I have personally reviewed pertinent reports.   Results from last 7 days   Lab Units 23  0527 23  0554 23  0448 23  1557 08/15/23  2040   WBC Thousand/uL 9.19 14.27* 11.51* 14.33* 10.73*   HEMOGLOBIN g/dL 12.0 13.0 12.5 14.7 14. 7   HEMATOCRIT % 36.6 39.4 38.0 43.9 44.5   PLATELETS Thousands/uL 252 269 244 283 324   NEUTROS PCT %  --   --   --  69  --    MONOS PCT %  --   --   --  9  --    MONO PCT %  --   --   --   --  4   EOS PCT %  --   --   --  0 0      Results from last 7 days   Lab Units 08/20/23  0527 08/19/23  0554 08/18/23  0448 08/17/23  1557 08/15/23  2040   POTASSIUM mmol/L 4.3 4.1 3.9   < > 3.4*   CHLORIDE mmol/L 110* 109* 103   < > 103   CO2 mmol/L 27 25 24   < > 23   BUN mg/dL 19 19 17   < > 13   CREATININE mg/dL 1.28 1.42* 2.02*   < > 1.28   CALCIUM mg/dL 8.6 9.3 8.7   < > 9.2   ALK PHOS U/L  --   --  47  --  55   ALT U/L  --   --  9  --  17   AST U/L  --   --  10*  --  16    < > = values in this interval not displayed. IMAGING & DIAGNOSTIC TESTING     Radiology Results: I have personally reviewed pertinent reports. FL retrograde pyelogram    Result Date: 8/18/2023  Impression: Fluoroscopic guidance provided for right retrograde pyelogram. Please see procedure report for further details. Workstation performed: [id number]     Other Diagnostic Testing: I have personally reviewed pertinent reports.     ACTIVE MEDICATIONS     Current Facility-Administered Medications   Medication Dose Route Frequency   • acetaminophen (TYLENOL) tablet 650 mg  650 mg Oral Q6H [address]   • cefTRIAXone (ROCEPHIN) 1,000 mg in dextrose 5 % 50 mL IVPB  1,000 mg Intravenous Q24H   • HYDROmorphone (DILAUDID) injection 0.5 mg  0.5 mg Intravenous Q2H PRN   • ketorolac (TORADOL) injection 15 mg  15 mg Intravenous Q6H [address]   • lactated ringers infusion  125 mL/hr Intravenous Continuous   • ondansetron (ZOFRAN) injection 4 mg  4 mg Intravenous Q6H PRN   • oxybutynin (DITROPAN-XL) 24 hr tablet 15 mg  15 mg Oral Daily   • oxyCODONE (ROXICODONE) IR tablet 5 mg  5 mg Oral Q4H PRN    Or   • oxyCODONE (ROXICODONE) IR tablet 10 mg  10 mg Oral Q4H PRN   • phenazopyridine (PYRIDIUM) tablet 200 mg  200 mg Oral TID With Meals   • senna (SENOKOT) tablet 8.6 mg  1 tablet Oral Daily   • tamsulosin (FLOMAX) capsule 0.4 mg  0.4 mg Oral HS       VTE Pharmacologic Prophylaxis: Reason for no pharmacologic prophylaxis Pending D/C. Can consider restarting if staying today  VTE Mechanical Prophylaxis: sequential compression device    Portions of the record may have been created with voice recognition software. Occasional wrong word or "sound a like" substitutions may have occurred due to the inherent limitations of voice recognition software.   Read the chart carefully and recognize, using context, where substitutions have occurred.  ==  Alissa Quiroga MD  [address] [facility]  Internal Medicine Residency PGY-2

## 2023-08-20 NOTE — PLAN OF CARE
Problem: PAIN - ADULT  Goal: Verbalizes/displays adequate comfort level or baseline comfort level  Description: Interventions:  - Encourage patient to monitor pain and request assistance  - Assess pain using appropriate pain scale  - Administer analgesics based on type and severity of pain and evaluate response  - Implement non-pharmacological measures as appropriate and evaluate response  - Consider cultural and social influences on pain and pain management  - Notify physician/advanced practitioner if interventions unsuccessful or patient reports new pain  Outcome: Progressing     Problem: INFECTION - ADULT  Goal: Absence or prevention of progression during hospitalization  Description: INTERVENTIONS:  - Assess and monitor for signs and symptoms of infection  - Monitor lab/diagnostic results  - Monitor all insertion sites, i.e. indwelling lines, tubes, and drains  - Monitor endotracheal if appropriate and nasal secretions for changes in amount and color  - [place] appropriate cooling/warming therapies per order  - Administer medications as ordered  - Instruct and encourage patient and family to use good hand hygiene technique  - Identify and instruct in appropriate isolation precautions for identified infection/condition  Outcome: Progressing  Goal: Absence of fever/infection during neutropenic period  Description: INTERVENTIONS:  - Monitor WBC    Outcome: Progressing     Problem: SAFETY ADULT  Goal: Patient will remain free of falls  Description: INTERVENTIONS:  - Educate patient/family on patient safety including physical limitations  - Instruct patient to call for assistance with activity   - Consult OT/PT to assist with strengthening/mobility   - Keep Call bell within reach  - Keep bed low and locked with side rails adjusted as appropriate  - Keep care items and personal belongings within reach  - Initiate and maintain comfort rounds  - Make Fall Risk Sign visible to staff  - Offer Toileting every    Hours, in advance of need  - Initiate/Maintain   alarm  - Obtain necessary fall risk management equipment:       - Apply yellow socks and bracelet for high fall risk patients  - Consider moving patient to room near nurses station  Outcome: Progressing  Goal: Maintain or return to baseline ADL function  Description: INTERVENTIONS:  -  Assess patient's ability to carry out ADLs; assess patient's baseline for ADL function and identify physical deficits which impact ability to perform ADLs (bathing, care of mouth/teeth, toileting, grooming, dressing, etc.)  - Assess/evaluate cause of self-care deficits   - Assess range of motion  - Assess patient's mobility; develop plan if impaired  - Assess patient's need for assistive devices and provide as appropriate  - Encourage maximum independence but intervene and supervise when necessary  - Involve family in performance of ADLs  - Assess for home care needs following discharge   - Consider OT consult to assist with ADL evaluation and planning for discharge  - Provide patient education as appropriate  Outcome: Progressing  Goal: Maintains/Returns to pre admission functional level  Description: INTERVENTIONS:  - Perform BMAT or MOVE assessment daily.   - Set and communicate daily mobility goal to care team and patient/family/caregiver. - Collaborate with rehabilitation services on mobility goals if consulted  - Perform Range of Motion  times a day. - Reposition patient every  hours.   - Dangle patient  times a day  - Stand patient  times a day  - Ambulate patient  times a day  - Out of bed to chair  times a day   - Out of bed for meals  times a day  - Out of bed for toileting  - Record patient progress and toleration of activity level   Outcome: Progressing     Problem: Knowledge Deficit  Goal: Patient/family/caregiver demonstrates understanding of disease process, treatment plan, medications, and discharge instructions  Description: Complete learning assessment and assess knowledge base.  Interventions:  - Provide teaching at level of understanding  - Provide teaching via preferred learning methods  Outcome: Progressing     Problem: DISCHARGE PLANNING  Goal: Discharge to home or other facility with appropriate resources  Description: INTERVENTIONS:  - Identify barriers to discharge w/patient and caregiver  - Arrange for needed discharge resources and transportation as appropriate  - Identify discharge learning needs (meds, wound care, etc.)  - Arrange for interpretive services to assist at discharge as needed  - Refer to Case Management Department for coordinating discharge planning if the patient needs post-hospital services based on physician/advanced practitioner order or complex needs related to functional status, cognitive ability, or social support system  Outcome: Progressing

## 2023-08-21 VITALS
TEMPERATURE: 98.2 F | DIASTOLIC BLOOD PRESSURE: 90 MMHG | RESPIRATION RATE: 17 BRPM | OXYGEN SATURATION: 97 % | SYSTOLIC BLOOD PRESSURE: 144 MMHG | HEART RATE: 71 BPM

## 2023-08-21 LAB
ANION GAP SERPL CALCULATED.3IONS-SCNC: 4 MMOL/L
BASOPHILS # BLD AUTO: 0.02 THOUSANDS/ÂΜL (ref 0–0.1)
BASOPHILS NFR BLD AUTO: 0 % (ref 0–1)
BUN SERPL-MCNC: 17 MG/DL (ref 5–25)
CALCIUM SERPL-MCNC: 9.2 MG/DL (ref 8.3–10.1)
CHLORIDE SERPL-SCNC: 105 MMOL/L (ref 96–108)
CO2 SERPL-SCNC: 29 MMOL/L (ref 21–32)
CREAT SERPL-MCNC: 1.31 MG/DL (ref 0.6–1.3)
EOSINOPHIL # BLD AUTO: 0.1 THOUSAND/ÂΜL (ref 0–0.61)
EOSINOPHIL NFR BLD AUTO: 1 % (ref 0–6)
ERYTHROCYTE [DISTWIDTH] IN BLOOD BY AUTOMATED COUNT: 12.2 % (ref 11.6–15.1)
GFR SERPL CREATININE-BSD FRML MDRD: 70 ML/MIN/1.73SQ M
GLUCOSE SERPL-MCNC: 78 MG/DL (ref 65–140)
HCT VFR BLD AUTO: 37.9 % (ref 36.5–49.3)
HGB BLD-MCNC: 12.3 G/DL (ref 12–17)
IMM GRANULOCYTES # BLD AUTO: 0.02 THOUSAND/UL (ref 0–0.2)
IMM GRANULOCYTES NFR BLD AUTO: 0 % (ref 0–2)
LYMPHOCYTES # BLD AUTO: 3.19 THOUSANDS/ÂΜL (ref 0.6–4.47)
LYMPHOCYTES NFR BLD AUTO: 44 % (ref 14–44)
MCH RBC QN AUTO: 27.2 PG (ref 26.8–34.3)
MCHC RBC AUTO-ENTMCNC: 32.5 G/DL (ref 31.4–37.4)
MCV RBC AUTO: 84 FL (ref 82–98)
MONOCYTES # BLD AUTO: 0.51 THOUSAND/ÂΜL (ref 0.17–1.22)
MONOCYTES NFR BLD AUTO: 7 % (ref 4–12)
NEUTROPHILS # BLD AUTO: 3.36 THOUSANDS/ÂΜL (ref 1.85–7.62)
NEUTS SEG NFR BLD AUTO: 48 % (ref 43–75)
NRBC BLD AUTO-RTO: 0 /100 WBCS
PLATELET # BLD AUTO: 295 THOUSANDS/UL (ref 149–390)
PMV BLD AUTO: 9.9 FL (ref 8.9–12.7)
POTASSIUM SERPL-SCNC: 3.8 MMOL/L (ref 3.5–5.3)
RBC # BLD AUTO: 4.53 MILLION/UL (ref 3.88–5.62)
SODIUM SERPL-SCNC: 138 MMOL/L (ref 135–147)
WBC # BLD AUTO: 7.2 THOUSAND/UL (ref 4.31–10.16)

## 2023-08-21 PROCEDURE — 85025 COMPLETE CBC W/AUTO DIFF WBC: CPT

## 2023-08-21 PROCEDURE — 99238 HOSP IP/OBS DSCHRG MGMT 30/<: CPT | Performed by: INTERNAL MEDICINE

## 2023-08-21 PROCEDURE — 80048 BASIC METABOLIC PNL TOTAL CA: CPT

## 2023-08-21 RX ORDER — TAMSULOSIN HYDROCHLORIDE 0.4 MG/1
0.4 CAPSULE ORAL
Qty: 30 CAPSULE | Refills: 0 | Status: SHIPPED | OUTPATIENT
Start: 2023-08-21

## 2023-08-21 RX ORDER — BISACODYL 5 MG/1
10 TABLET, DELAYED RELEASE ORAL DAILY PRN
Status: DISCONTINUED | OUTPATIENT
Start: 2023-08-21 | End: 2023-08-21 | Stop reason: HOSPADM

## 2023-08-21 RX ORDER — SENNOSIDES 8.6 MG
2 TABLET ORAL DAILY
Status: DISCONTINUED | OUTPATIENT
Start: 2023-08-21 | End: 2023-08-21 | Stop reason: HOSPADM

## 2023-08-21 RX ORDER — OXYBUTYNIN CHLORIDE 15 MG/1
15 TABLET, EXTENDED RELEASE ORAL DAILY
Qty: 28 TABLET | Refills: 0 | Status: SHIPPED | OUTPATIENT
Start: 2023-08-22 | End: 2023-09-19

## 2023-08-21 RX ORDER — PHENAZOPYRIDINE HYDROCHLORIDE 200 MG/1
200 TABLET, FILM COATED ORAL
Qty: 10 TABLET | Refills: 0 | Status: SHIPPED | OUTPATIENT
Start: 2023-08-21

## 2023-08-21 RX ADMIN — ACETAMINOPHEN 650 MG: 325 TABLET, FILM COATED ORAL at 06:39

## 2023-08-21 RX ADMIN — SODIUM CHLORIDE, SODIUM LACTATE, POTASSIUM CHLORIDE, AND CALCIUM CHLORIDE 125 ML/HR: .6; .31; .03; .02 INJECTION, SOLUTION INTRAVENOUS at 03:13

## 2023-08-21 RX ADMIN — PHENAZOPYRIDINE 200 MG: 100 TABLET ORAL at 06:39

## 2023-08-21 RX ADMIN — CEFTRIAXONE SODIUM 1000 MG: 10 INJECTION, POWDER, FOR SOLUTION INTRAVENOUS at 05:14

## 2023-08-21 RX ADMIN — SENNOSIDES 8.6 MG: 8.6 TABLET, FILM COATED ORAL at 10:42

## 2023-08-21 RX ADMIN — OXYBUTYNIN 15 MG: 10 TABLET, FILM COATED, EXTENDED RELEASE ORAL at 10:42

## 2023-08-21 NOTE — DISCHARGE SUMMARY
INTERNAL MEDICINE RESIDENCY DISCHARGE SUMMARY     Sunday Jett   26 y.o. male  MRN: [id number]  Room/Bed: [facility] 56/6 872-01     [address] [facility]   Encounter: [id number]    Active Problems: There are no active Hospital Problems. Leukocytosis-resolved as of 8/20/2023  Assessment & Plan  Likely reactive in setting of UVJ stone. Febrile to 100.8 degrees previously. · Mild leucocytosis  · UA negative  · U Cx negative    S/p stent placement on 08/18; cloudy urine drained during procedure  Afebrile overnight    Plan  · Continue ceftriaxone coverage day 2/5  · B Cx - x24 hours, continue to trend    Acute kidney injury (HCC)-resolved as of 8/20/2023  Assessment & Plan  ALEJANDRINA likely 2/2 obstructive uropathy  No underlying CKD  creatinine 2.12-> 1.4-> 1.28  Plan  · Continue IV hydration with  ml/hr  · Daily BMP  · Avoid nephrotoxins and hypotension and monitor electrolytes and BUNs/creatinine    * Ureteral stone with hydronephrosis-resolved as of 8/20/2023  Assessment & Plan  Patient presented on 8/15 with flank pain and later discharged on medical management same day. Patient represented to [facility] 8/17 w/ right sided flank pain and nausea. Afebrile, leucocytosis resolved  UA unremarkable    Imaging:  CT abdomen (8/15)- right UVJ stone w/ moderate hydronephrosis. Now s/p stent placement on 08/18; cloudy urine drained during procedure    Plan  · Continue ceftriaxone coverage day 3/5  · Urology following-discussed pain regimen due to acute L flank pain  · Continue IV hydration /hour  · IV pain management PRN          [address] [place]     As per Dr. Danita Ruiz note, "26 y/o male with PMH of nephrolithiasis presented [address] on 8/17 for right flank pain. Patient states that over the past 4 days, has has developed worsening right flank pain with nausea and one episode of vomiting.  He initially presented to [facility] ED on 8/15 with similar complaints and a CT abdomen was performed demonstrating a right UVJ 3x5 mm kidney stone causing moderating hydronephrosis. He was discharged home due to lack of urinary infection, but returned on 8/17 due to insufficient pain control with tylenol and ibuprofen. Denies dysuria, hematuria.     At [facility], patient was found to have an ALEJANDRINA secondary to obstructive uropathy and was given aggressive IV hydration which is being continued. He was febrile to 100.8 with leukocytosis despite negative UA.     He was then transferred to [address] [place] [address] and received stent placement by urology on 8/18. Culture still pending and patient will remain on ceftriaxone per urology."    Patient underwent cystoscopy retrograde pyelogram with right stent insertion on 8/18 with Dr. Ladonna Bearden. Tolerated procedure with no complications. Postoperatively, on 8/20, patient complained of right flank pain, rated 8/10, radiating to genitofemoral area. Due to patient claiming pain was severe, U/S of the kidney/bladder. U/S Kidney/Bladder 8/20: Right ureteral stent does appear to be properly positioned at the level of the right kidney and within the bladder. No ureteral jet is identified however. There is only minimal fullness of the right renal collecting system. There is some fluid in the right ureter making the stent visible distally of uncertain significance. Potentially some dysfunction of the ureter, perhaps related to spasm, could cause some increased fluid within the ureter. After reviewing ultrasound findings, Urology felt comfortable discharging the patient with pain and antispasmotic medications. Patient was discharged by medicine team on 8/21/2023.      DISCHARGE INFORMATION     PCP at Discharge: Steven Aggarwal Provider: Chet Pemberton MD  Admission Date: 8/18/2023    Discharge Provider: Ceht Pemberton MD  Discharge Date: 8/21/2023    Discharge Disposition: PRA - Acute Care  Discharge Condition: good  Discharge with Lines: no    Discharge Diet: regular diet  Activity Restrictions: none  Test Results Pending at Discharge: none    Discharge Diagnoses:  Principal Problem (Resolved):    Ureteral stone with hydronephrosis  Active Problems:    * No active hospital problems. *  Resolved Problems:    Acute kidney injury ([address])    Leukocytosis      Consulting Providers:      Diagnostic & Therapeutic Procedures Performed:  US kidney and bladder    Result Date: 8/20/2023  Impression: Right ureteral stent does appear to be properly positioned at the level of the right kidney and within the bladder. No ureteral jet is identified however. There is only minimal fullness of the right renal collecting system. There is some fluid in the right ureter making the stent visible distally of uncertain significance. Potentially some dysfunction of the ureter, perhaps related to spasm, could cause some increased fluid within the ureter. Nonobstructing calculi are noted bilaterally. The study was marked in [facility] for immediate notification. Workstation performed: [id number]     FL retrograde pyelogram    Result Date: 8/18/2023  Impression: Fluoroscopic guidance provided for right retrograde pyelogram. Please see procedure report for further details. Workstation performed: [id number]       Code Status: Level 1 - Full Code  Advance Directive & Living Will: <no information>  Power of :    POLST:      Medications:  Current Discharge Medication List        Current Discharge Medication List        Current Discharge Medication List      CONTINUE these medications which have NOT CHANGED    Details   acetaminophen (TYLENOL) 500 mg tablet Take 500 mg by mouth every 6 (six) hours as needed for mild pain      ibuprofen (MOTRIN) 400 mg tablet Take 400 mg by mouth every 6 (six) hours as needed for mild pain             Allergies:   Allergies   Allergen Reactions   • Cortisone Hives       FOLLOW-UP     PCP Outpatient Follow-up:  [facility]    Consulting Providers Follow-up:  Follow Up with Urology     Active Issues Requiring Follow-up:   none    Discharge Statement:   I spent 30 minutes minutes discharging the patient. This time was spent on the day of discharge. I had direct contact with the patient on the day of discharge. Additional documentation is required if more than 30 minutes were spent on discharge. Portions of the record may have been created with voice recognition software. Occasional wrong word or "sound a like" substitutions may have occurred due to the inherent limitations of voice recognition software.   Read the chart carefully and recognize, using context, where substitutions have occurred.    ==  Padmini Brewer, [address] [facility]  Internal Medicine Resident PGY-1

## 2023-08-22 ENCOUNTER — TELEPHONE (OUTPATIENT)
Dept: UROLOGY | Facility: MEDICAL CENTER | Age: 33
End: 2023-08-22

## 2023-08-22 NOTE — TELEPHONE ENCOUNTER
Patient stented by Dr. Pinky Lesch on [facility] call 8/18    "Stent placed on call, will next next available MD at [address] [facility],[address] in about 2-6 weeks. Thanks"! Holding 9/20 @ [address] [place] [address],[address] with Dr. Charito Nathan  Emailed MD for case approval - waiting on response.

## 2023-08-23 LAB
BACTERIA BLD CULT: NORMAL

## 2023-08-25 ENCOUNTER — PREP FOR PROCEDURE (OUTPATIENT)
Dept: UROLOGY | Facility: MEDICAL CENTER | Age: 33
End: 2023-08-25

## 2023-08-25 DIAGNOSIS — N20.0 CALCULUS OF KIDNEY: Primary | ICD-10-CM

## 2023-08-25 DIAGNOSIS — R39.89 SUSPECTED UTI: ICD-10-CM

## 2023-08-25 DIAGNOSIS — Z01.812 PRE-OPERATIVE LABORATORY EXAMINATION: ICD-10-CM

## 2023-08-25 NOTE — TELEPHONE ENCOUNTER
Called and spoke with patient;s aunt Pati Shen to schedule surgery for patient. She confirmed 9/20 @ [facility] with Dr. Jim Silva. Went over prep with her, and let her know I will mail a packet to them as well. Confirmed address listed.      She is aware he needs a ride, NPO, hospital calls day prior, and ucx is needed 2-3 weeks     Mailed  - no insurance listed - [place] pending     H&P on admit - no insurance  Consent on admit - faxed to PAT 8/25

## 2023-09-18 ENCOUNTER — ANESTHESIA EVENT (OUTPATIENT)
Dept: PERIOP | Facility: AMBULARY SURGERY CENTER | Age: 33
End: 2023-09-18

## 2023-09-18 PROBLEM — N20.1 RIGHT URETERAL STONE: Status: ACTIVE | Noted: 2023-09-18

## 2023-09-18 PROCEDURE — NC001 PR NO CHARGE: Performed by: UROLOGY

## 2023-09-18 NOTE — DISCHARGE INSTR - AVS FIRST PAGE
Hoy le hemos hecho ureteroscopia. Usted ya no tiene calculos. Suele ser normal [place] y frecuencia para orinar despues de [place]. Le hemos recetado medicamentos para ayudar con el proceso de sanarse. Favor de quitar fermin stent ureteral en 5 lozano (favor de quitar la venda gregory del pene y jalar la cuerdita arfa para quitar un tubito de plastico windy). Usted tendra yelena visita en la clinica en algunas semanas despues de juan r unos imagenes de ultrasonido para ming si esta drenando niurka el rinon.     Dallis Grumbles, Dr. Dorman Peabody

## 2023-09-18 NOTE — H&P
H&P Exam - Urology   [address] 26 y.o. male MRN: [id number]  Unit/Bed#: OR [place] Encounter: [id number]    Assessment/Plan     Assessment:  26year old man s/p right ureteral stenting roughly 1 month ago, returns for right ureteroscopy and laser lithotripsy for definitive stone management     Marked on his right arm    Plan:  Proceed to right URS and LL with all indicated procedures    History of Present Illness   HPI:  [address] is a 26 y.o. male who presents with a right ureteral stone s/p previous stenting. Review of Systems   Constitutional: Negative. HENT: Negative. Eyes: Negative. Respiratory: Negative. Cardiovascular: Negative. Gastrointestinal: Negative. Endocrine: Negative. Genitourinary: Positive for flank pain (right). Skin: Negative. Allergic/Immunologic: Negative. Neurological: Negative. Hematological: Negative. Psychiatric/Behavioral: Negative. Historical Information   No past medical history on file. Past Surgical History:   Procedure Laterality Date   • FL RETROGRADE PYELOGRAM  8/18/2023   • TX CYSTO BLADDER W/URETERAL CATHETERIZATION Right 8/18/2023    Procedure: CYSTOSCOPY RETROGRADE PYELOGRAM WITH INSERTION STENT URETERAL;  Surgeon: Mellissa Hayes MD;  Location: [facility] OR;  Service: Urology     Social History   Social History     Substance and Sexual Activity   Alcohol Use Yes     Social History     Substance and Sexual Activity   Drug Use Never     Social History     Tobacco Use   Smoking Status Never   Smokeless Tobacco Never     E-Cigarette/Vaping   • E-Cigarette Use Never User      E-Cigarette/Vaping Substances     Family History: No family history on file. Meds/Allergies   all medications and allergies reviewed  Allergies   Allergen Reactions   • Cortisone Hives       Objective   Vitals: Blood pressure 134/96, pulse 57, temperature 98 °F (36.7 °C), temperature source Temporal, resp. rate 18, SpO2 97 %.     No intake/output data recorded. Invasive Devices     Peripheral Intravenous Line  Duration           Peripheral IV 08/20/23 Dorsal (posterior); Left Wrist 30 days    Peripheral IV 09/20/23 Right <1 day                Physical Exam  Vitals reviewed. Constitutional:       General: He is not in acute distress. Appearance: Normal appearance. He is not ill-appearing, toxic-appearing or diaphoretic. HENT:      Head: Normocephalic and atraumatic. Eyes:      General: No scleral icterus. Right eye: No discharge. Left eye: No discharge. Pulmonary:      Effort: Pulmonary effort is normal.   Abdominal:      General: There is no distension. Musculoskeletal:         General: No swelling. Skin:     Coloration: Skin is not jaundiced. Neurological:      General: No focal deficit present. Mental Status: He is alert. Cranial Nerves: No cranial nerve deficit. Psychiatric:         Mood and Affect: Mood normal.         Behavior: Behavior normal.         Thought Content: Thought content normal.         Judgment: Judgment normal.         Lab Results: I have personally reviewed pertinent reports. Imaging: I have personally reviewed pertinent reports. EKG, Pathology, and Other Studies: I have personally reviewed pertinent reports. VTE Prophylaxis: Sequential compression device Shivani Parrish)     Code Status: Prior  Advance Directive and Living Will:      Power of :    POLST:      Counseling / Coordination of Care  Total floor / unit time spent today 15 minutes. Greater than 50% of total time was spent with the patient and / or family counseling and / or coordination of care. A description of the counseling / coordination of care: review of today's case.

## 2023-09-20 ENCOUNTER — HOSPITAL ENCOUNTER (OUTPATIENT)
Dept: RADIOLOGY | Facility: AMBULARY SURGERY CENTER | Age: 33
Discharge: HOME | End: 2023-09-20

## 2023-09-20 ENCOUNTER — HOSPITAL ENCOUNTER (OUTPATIENT)
Facility: AMBULARY SURGERY CENTER | Age: 33
Setting detail: OUTPATIENT SURGERY
Discharge: HOME | End: 2023-09-20
Attending: UROLOGY | Admitting: UROLOGY

## 2023-09-20 ENCOUNTER — ANESTHESIA (OUTPATIENT)
Dept: PERIOP | Facility: AMBULARY SURGERY CENTER | Age: 33
End: 2023-09-20

## 2023-09-20 ENCOUNTER — TELEPHONE (OUTPATIENT)
Dept: UROLOGY | Facility: CLINIC | Age: 33
End: 2023-09-20

## 2023-09-20 VITALS
TEMPERATURE: 98 F | HEART RATE: 63 BPM | RESPIRATION RATE: 16 BRPM | SYSTOLIC BLOOD PRESSURE: 137 MMHG | DIASTOLIC BLOOD PRESSURE: 88 MMHG | OXYGEN SATURATION: 99 %

## 2023-09-20 DIAGNOSIS — N20.1 RIGHT URETERAL STONE: Primary | ICD-10-CM

## 2023-09-20 DIAGNOSIS — N20.1 RIGHT URETERAL STONE: ICD-10-CM

## 2023-09-20 DIAGNOSIS — N13.2 URETERAL STONE WITH HYDRONEPHROSIS: ICD-10-CM

## 2023-09-20 PROCEDURE — C1769 GUIDE WIRE: HCPCS | Performed by: UROLOGY

## 2023-09-20 PROCEDURE — C2617 STENT, NON-COR, TEM W/O DEL: HCPCS | Performed by: UROLOGY

## 2023-09-20 PROCEDURE — 74420 UROGRAPHY RTRGR +-KUB: CPT

## 2023-09-20 PROCEDURE — C1758 CATHETER, URETERAL: HCPCS | Performed by: UROLOGY

## 2023-09-20 RX ORDER — DICLOFENAC POTASSIUM 50 MG/1
50 TABLET, FILM COATED ORAL 2 TIMES DAILY
Qty: 10 TABLET | Refills: 0 | Status: SHIPPED | OUTPATIENT
Start: 2023-09-20 | End: 2023-09-25

## 2023-09-20 RX ORDER — DEXAMETHASONE SODIUM PHOSPHATE 10 MG/ML
INJECTION, SOLUTION INTRAMUSCULAR; INTRAVENOUS AS NEEDED
Status: DISCONTINUED | OUTPATIENT
Start: 2023-09-20 | End: 2023-09-20

## 2023-09-20 RX ORDER — SULFAMETHOXAZOLE AND TRIMETHOPRIM 800; 160 MG/1; MG/1
1 TABLET ORAL EVERY 12 HOURS SCHEDULED
Qty: 6 TABLET | Refills: 0 | Status: SHIPPED | OUTPATIENT
Start: 2023-09-20 | End: 2023-09-23

## 2023-09-20 RX ORDER — METOCLOPRAMIDE HYDROCHLORIDE 5 MG/ML
10 INJECTION INTRAMUSCULAR; INTRAVENOUS ONCE AS NEEDED
Status: DISCONTINUED | OUTPATIENT
Start: 2023-09-20 | End: 2023-09-20 | Stop reason: HOSPADM

## 2023-09-20 RX ORDER — ACETAMINOPHEN 325 MG/1
650 TABLET ORAL EVERY 6 HOURS PRN
Status: CANCELLED | OUTPATIENT
Start: 2023-09-20

## 2023-09-20 RX ORDER — SODIUM CHLORIDE, SODIUM LACTATE, POTASSIUM CHLORIDE, CALCIUM CHLORIDE 600; 310; 30; 20 MG/100ML; MG/100ML; MG/100ML; MG/100ML
INJECTION, SOLUTION INTRAVENOUS CONTINUOUS PRN
Status: DISCONTINUED | OUTPATIENT
Start: 2023-09-20 | End: 2023-09-20

## 2023-09-20 RX ORDER — FENTANYL CITRATE 50 UG/ML
INJECTION, SOLUTION INTRAMUSCULAR; INTRAVENOUS AS NEEDED
Status: DISCONTINUED | OUTPATIENT
Start: 2023-09-20 | End: 2023-09-20

## 2023-09-20 RX ORDER — OXYCODONE HYDROCHLORIDE 5 MG/1
5 TABLET ORAL EVERY 4 HOURS PRN
Status: CANCELLED | OUTPATIENT
Start: 2023-09-20

## 2023-09-20 RX ORDER — TAMSULOSIN HYDROCHLORIDE 0.4 MG/1
0.4 CAPSULE ORAL
Qty: 14 CAPSULE | Refills: 0 | Status: SHIPPED | OUTPATIENT
Start: 2023-09-20 | End: 2023-10-04

## 2023-09-20 RX ORDER — MAGNESIUM HYDROXIDE 1200 MG/15ML
LIQUID ORAL AS NEEDED
Status: DISCONTINUED | OUTPATIENT
Start: 2023-09-20 | End: 2023-09-20 | Stop reason: HOSPADM

## 2023-09-20 RX ORDER — PROPOFOL 10 MG/ML
INJECTION, EMULSION INTRAVENOUS AS NEEDED
Status: DISCONTINUED | OUTPATIENT
Start: 2023-09-20 | End: 2023-09-20

## 2023-09-20 RX ORDER — ONDANSETRON 2 MG/ML
INJECTION INTRAMUSCULAR; INTRAVENOUS AS NEEDED
Status: DISCONTINUED | OUTPATIENT
Start: 2023-09-20 | End: 2023-09-20

## 2023-09-20 RX ORDER — KETOROLAC TROMETHAMINE 30 MG/ML
INJECTION, SOLUTION INTRAMUSCULAR; INTRAVENOUS AS NEEDED
Status: DISCONTINUED | OUTPATIENT
Start: 2023-09-20 | End: 2023-09-20

## 2023-09-20 RX ORDER — DIPHENHYDRAMINE HCL 25 MG
12.5 TABLET ORAL EVERY 6 HOURS PRN
Status: CANCELLED | OUTPATIENT
Start: 2023-09-20

## 2023-09-20 RX ORDER — MIDAZOLAM HYDROCHLORIDE 2 MG/2ML
INJECTION, SOLUTION INTRAMUSCULAR; INTRAVENOUS AS NEEDED
Status: DISCONTINUED | OUTPATIENT
Start: 2023-09-20 | End: 2023-09-20

## 2023-09-20 RX ORDER — OXYCODONE HYDROCHLORIDE 5 MG/1
10 TABLET ORAL EVERY 4 HOURS PRN
Status: CANCELLED | OUTPATIENT
Start: 2023-09-20

## 2023-09-20 RX ORDER — PHENAZOPYRIDINE HYDROCHLORIDE 100 MG/1
100 TABLET, FILM COATED ORAL
Status: CANCELLED | OUTPATIENT
Start: 2023-09-20

## 2023-09-20 RX ORDER — LIDOCAINE HYDROCHLORIDE 10 MG/ML
INJECTION, SOLUTION EPIDURAL; INFILTRATION; INTRACAUDAL; PERINEURAL AS NEEDED
Status: DISCONTINUED | OUTPATIENT
Start: 2023-09-20 | End: 2023-09-20

## 2023-09-20 RX ORDER — ONDANSETRON 2 MG/ML
4 INJECTION INTRAMUSCULAR; INTRAVENOUS EVERY 6 HOURS PRN
Status: CANCELLED | OUTPATIENT
Start: 2023-09-20

## 2023-09-20 RX ORDER — ACETAMINOPHEN 500 MG
500 TABLET ORAL EVERY 6 HOURS
Qty: 20 TABLET | Refills: 0 | Status: SHIPPED | OUTPATIENT
Start: 2023-09-20 | End: 2023-09-25

## 2023-09-20 RX ORDER — SODIUM CHLORIDE, SODIUM LACTATE, POTASSIUM CHLORIDE, CALCIUM CHLORIDE 600; 310; 30; 20 MG/100ML; MG/100ML; MG/100ML; MG/100ML
100 INJECTION, SOLUTION INTRAVENOUS CONTINUOUS
Status: DISCONTINUED | OUTPATIENT
Start: 2023-09-20 | End: 2023-09-20 | Stop reason: HOSPADM

## 2023-09-20 RX ORDER — PHENAZOPYRIDINE HYDROCHLORIDE 200 MG/1
200 TABLET, FILM COATED ORAL
Qty: 6 TABLET | Refills: 0 | Status: SHIPPED | OUTPATIENT
Start: 2023-09-20 | End: 2023-09-22

## 2023-09-20 RX ORDER — SODIUM CHLORIDE, SODIUM LACTATE, POTASSIUM CHLORIDE, CALCIUM CHLORIDE 600; 310; 30; 20 MG/100ML; MG/100ML; MG/100ML; MG/100ML
125 INJECTION, SOLUTION INTRAVENOUS CONTINUOUS
Status: CANCELLED | OUTPATIENT
Start: 2023-09-20

## 2023-09-20 RX ORDER — FENTANYL CITRATE/PF 50 MCG/ML
25 SYRINGE (ML) INJECTION
Status: DISCONTINUED | OUTPATIENT
Start: 2023-09-20 | End: 2023-09-20 | Stop reason: HOSPADM

## 2023-09-20 RX ORDER — OXYBUTYNIN CHLORIDE 15 MG/1
15 TABLET, EXTENDED RELEASE ORAL DAILY
Qty: 7 TABLET | Refills: 0 | Status: SHIPPED | OUTPATIENT
Start: 2023-09-20 | End: 2023-09-27

## 2023-09-20 RX ADMIN — ONDANSETRON 4 MG: 2 INJECTION INTRAMUSCULAR; INTRAVENOUS at 12:16

## 2023-09-20 RX ADMIN — PROPOFOL 200 MG: 10 INJECTION, EMULSION INTRAVENOUS at 11:51

## 2023-09-20 RX ADMIN — FENTANYL CITRATE 25 MCG: 50 INJECTION INTRAMUSCULAR; INTRAVENOUS at 12:16

## 2023-09-20 RX ADMIN — FENTANYL CITRATE 50 MCG: 50 INJECTION INTRAMUSCULAR; INTRAVENOUS at 11:58

## 2023-09-20 RX ADMIN — KETOROLAC TROMETHAMINE 30 MG: 30 INJECTION, SOLUTION INTRAMUSCULAR; INTRAVENOUS at 12:03

## 2023-09-20 RX ADMIN — LIDOCAINE HYDROCHLORIDE 100 MG: 10 INJECTION, SOLUTION EPIDURAL; INFILTRATION; INTRACAUDAL; PERINEURAL at 11:51

## 2023-09-20 RX ADMIN — FENTANYL CITRATE 25 MCG: 50 INJECTION INTRAMUSCULAR; INTRAVENOUS at 12:04

## 2023-09-20 RX ADMIN — SODIUM CHLORIDE, SODIUM LACTATE, POTASSIUM CHLORIDE, AND CALCIUM CHLORIDE: .6; .31; .03; .02 INJECTION, SOLUTION INTRAVENOUS at 11:40

## 2023-09-20 RX ADMIN — MIDAZOLAM HYDROCHLORIDE 2 MG: 1 INJECTION, SOLUTION INTRAMUSCULAR; INTRAVENOUS at 11:47

## 2023-09-20 RX ADMIN — DEXAMETHASONE SODIUM PHOSPHATE 10 MG: 10 INJECTION, SOLUTION INTRAMUSCULAR; INTRAVENOUS at 12:00

## 2023-09-20 RX ADMIN — CEFTRIAXONE SODIUM 1000 MG: 10 INJECTION, POWDER, FOR SOLUTION INTRAVENOUS at 11:40

## 2023-09-20 RX ADMIN — IOHEXOL 5 ML: 240 INJECTION, SOLUTION INTRATHECAL; INTRAVASCULAR; INTRAVENOUS; ORAL at 12:18

## 2023-09-20 RX ADMIN — PROPOFOL 100 MG: 10 INJECTION, EMULSION INTRAVENOUS at 11:52

## 2023-09-20 NOTE — OP NOTE
OPERATIVE REPORT  PATIENT NAME: Neel Arcos    :  1990  MRN: [id number]  Pt Location: [facility] OR [facility]OM 04    SURGERY DATE: 2023    Surgeon(s) and Role:     * Ja Saucedo MD - Primary    Preop Diagnosis:  Right ureteral stone [N20.1]    Post-Op Diagnosis Codes:     * Right ureteral stone [N20.1]    Procedure(s):  Right - CYSTOSCOPY. DIAGNOSTIC URETEROSCOPY. RETROGRADE PYELOGRAM AND URETERAL STENT EXCHANGE    Specimen(s):  * No specimens in log *    Estimated Blood Loss:   Minimal    Drains:  Ureteral Internal Stent Right ureter 6 Fr. (Active)   Number of days: 0       Anesthesia Type:   General    Operative Indications:  Right ureteral stone [N20.1]      Operative Findings: The right ureteral stone has passed, systematic pyeloscopy shows re's plaques but no stones in the kidney, a 6 fr x 26 cm stent, RIGHT, was placed on a string    Complications:   None    Procedure and Technique:        PROCEDURES PERFORMED:  1) Cystoscopy  2) RIGHT retrograde pyelography with fluoroscopic interpretation  3) RIGHT ureteroscopy (diagnostic)  4) Right ureteral stent exchange (6F x 26cm)    SURGEON:  Ja Saucedo MD    ASSISTANTS:  none    NOTE:  There were no qualified teaching residents to assist with this case    ANESTHESIA: General     COMPLICATIONS:   None    ANTIBIOTICS:  ceftriaxone    INTRAOPERATIVE THROMBOEMBOLISM PROPHYLAXIS:  Pneumatic compression stockings       FINDINGS:    1. The RIGHT calculus was not radiopaque on plain fluoroscopy. 2. Retrograde pyelogram was performed on the Right side using a 5 Fr open ended catheter. Approximately 5 mL contrast was injected. 3. The following findings were noted: mild hydronephrosis, no filling defects, no extravasation      INDICATIONS FOR PROCEDURE:  Neel Arcos is an 26 y.o. old male with a RIGHT ureteral calculus s/p stenting some weeks ago.   After discussing the options for treatment, including medical expulsive therapy, extracorporeal shockwave lithotripsy, and ureteroscopy, the patient elected to undergo ureteroscopy and ureteral stent placement. We discussed the procedure in detail, the alternatives, and the risks, and they signed informed consent to proceed (these are outlined in the surgical consent form). PROCEDURE IN DETAIL:     The patient was identified by name, date of birth, and MRN  and brought to the OR. Antibiotic prophylaxis and DVT prophylaxis were administered as per the guidelines. They were placed in the dorsal lithotomy position with care to pad all pressure points. They were prepped and draped in the usual sterile fashion. A surgical time out was performed with all in the room in agreement with the correct patient, procedure, indications, and laterality. A 21-Ecuadorean rigid cystoscope was used to enter the bladder. The bladder was inspected in its entirety and there were no lesions noted. The ureteral orifices were identified in their normal orthotopic positions. The right stent was delivered per meatus and a wire was placed. Retrograde pyelography was performed    . Leaving this safety wire in place, the bladder was drained. A "7.5 Fr semi-rigid ureteroscope was advanced up the ureter under vision . No stone noted. A flexible scope was passed, systematic pyeloscopy performed, no stones noted but re's plaques noted    The ureteroscope was backed down the ureter under vision and there were no residual fragments and the ureter was noted to be intact with no injury and mild edema where the stone had been located. A 6 Fr x 26 cm JJ stent was then passed up the wire  under fluoroscopic guidance into the kidney with a good curl noted in the kidney and in the bladder. The stent string was not removed. The bladder was drained. All instrument counts and sponge counts were correct.     The patient was placed back into the supine position, awakened from general anesthesia and brought to recovery room in stable condition. ESTIMATED BLOOD LOSS:  Minimal      DRAINS:   Ureteral Internal Stent Right ureter 6 Fr. (Active)       SPECIMENS:   No specimens collected during this procedure. IMPLANTS:   Implant Name Type Inv. Item Serial No.  Lot No. LRB No. Used Action   STENT URETERAL 6 FR 26CM INLAY OPTIMA - [id number]  STENT URETERAL 6 FR 26CM INLAY OPTIMA  [facility] [id number] Right 1 Implanted   STENT URETERAL 6 FR 26CM INLAY OPTIMA - [id number]  STENT URETERAL 6 FR 1412 [facility] [id number] Right 1 Explanted        COMPLICATIONS: none    DISPOSITION: PACU     PLAN:  S/p URS, the patient has passed his stone and has no other stones in the kidney. He can removed his stent in 5 days and see us in the clinic in 6 weeks with a  Renal u/s prior     I was present for the entire procedure. and A qualified resident physician was not available.     Patient Disposition:  PACU         SIGNATURE: Lucía Sharma MD  DATE: September 20, 2023  TIME: 12:19 PM

## 2023-09-20 NOTE — INTERVAL H&P NOTE
H&P reviewed. After examining the patient I find no changes in the patients condition since the H&P had been written. Vitals:    09/20/23 1108   BP: 134/96   Pulse: 57   Resp: 18   Temp: 98 °F (36.7 °C)   SpO2: 97%     Marked on his right arm  I discussed with him in Chinese the pre, hellen, and post operative care for ureteroscopy and laser lithotripsy and he wishes to proceed with surgery.     Proceed to right URS and LL with all indicated procedures

## 2023-09-20 NOTE — ANESTHESIA PREPROCEDURE EVALUATION
Procedure:  CYSTOSCOPY URETEROSCOPY WITH LITHOTRIPSY HOLMIUM LASER, RETROGRADE PYELOGRAM AND INSERTION STENT URETERAL (Right: Bladder)    Relevant Problems   No relevant active problems        Physical Exam    Airway    Mallampati score: I  TM Distance: >3 FB  Neck ROM: full     Dental   No notable dental hx     Cardiovascular  Cardiovascular exam normal    Pulmonary  Pulmonary exam normal     Other Findings        Anesthesia Plan  ASA Score- 1     Anesthesia Type- general with ASA Monitors. Additional Monitors:   Airway Plan: LMA. Comment: Patient seen and examined, history reviewed. Patient to be done under general anesthesia with LMA and routine monitors. Risks discussed with the patient, consent obtained. .       Plan Factors-    Chart reviewed. Patient summary reviewed. Patient is not a current smoker. Induction- intravenous. Postoperative Plan-     Informed Consent- Anesthetic plan and risks discussed with patient. I personally reviewed this patient with the CRNA. Discussed and agreed on the Anesthesia Plan with the CRNA. Brooke Mckee

## 2023-09-20 NOTE — ANESTHESIA POSTPROCEDURE EVALUATION
Post-Op Assessment Note    CV Status:  Stable  Pain Score: 0    Pain management: adequate     Mental Status:  Awake and arousable   Hydration Status:  Euvolemic   PONV Controlled:  Controlled   Airway Patency:  Patent      Post Op Vitals Reviewed: Yes      Staff: CRNA         No notable events documented.     BP      Temp      Pulse     Resp      SpO2

## 2023-09-20 NOTE — TELEPHONE ENCOUNTER
Presently undergoing right URS and LL. The stent placed by Dr. Marbella Ramires has been removed, please update his database. He can see us in 6 weeks with a  4 weeks renal u/s prior (ordered).     Clerical team, please give him a note to return to work in 1 week (he will stop by today for this note on his way home)

## 2023-09-21 NOTE — TELEPHONE ENCOUNTER
ID: [id number]    Called and spoke to patient with . Reviewed stent expectations and how to remove stent on post-op day 5. Informed patient I can call on Monday to confirm successful removal and schedule f/u. Patient has no current questions at this time.

## 2023-09-25 NOTE — TELEPHONE ENCOUNTER
: [id number]    Attempted to call patient x2. Unable to leave a VM it just continuously rings. Will attempt later. If patient calls back please ask if he removed stent.  Also schedule f/u in 6-8 weeks and provide CS # so patient can schedule US and have it done prior to follow up appointment

## 2023-09-28 NOTE — TELEPHONE ENCOUNTER
: [id number]    Called and left VM for patient. Office number left in message.        If patient calls back please ask if he removed stent.  Also schedule f/u in 6-8 weeks and provide CS # so patient can schedule US and have it done prior to follow up appointment

## 2023-10-24 ENCOUNTER — DOCUMENTATION (OUTPATIENT)
Dept: UROLOGY | Facility: CLINIC | Age: 33
End: 2023-10-24

## 2023-11-14 ENCOUNTER — HOSPITAL ENCOUNTER (OUTPATIENT)
Dept: ULTRASOUND IMAGING | Facility: HOSPITAL | Age: 33
Discharge: HOME | End: 2023-11-14
Attending: UROLOGY
Payer: COMMERCIAL

## 2023-11-14 DIAGNOSIS — N20.1 RIGHT URETERAL STONE: ICD-10-CM

## 2023-11-14 PROCEDURE — 76770 US EXAM ABDO BACK WALL COMP: CPT

## 2024-01-18 ENCOUNTER — TELEPHONE (OUTPATIENT)
Dept: UROLOGY | Facility: CLINIC | Age: 34
End: 2024-01-18

## 2024-01-18 NOTE — TELEPHONE ENCOUNTER
Called Cameron Rosales and FLORY to contact office regarding appt for next week and insurance being inactive as per . Advised to call us to give us new information for insurance.

## 2024-01-22 NOTE — TELEPHONE ENCOUNTER
Called to confirm appt in [place] and to check on insurance. Pt states has no insurance at this time. As per  pt responsible for $108 for visit. Pt willing to pay. Appt confirmed and location address given. Pt verbalized understanding.

## 2024-01-24 ENCOUNTER — OFFICE VISIT (OUTPATIENT)
Dept: UROLOGY | Facility: CLINIC | Age: 34
End: 2024-01-24

## 2024-01-24 VITALS
BODY MASS INDEX: 26.84 KG/M2 | HEIGHT: 67 IN | WEIGHT: 171 LBS | HEART RATE: 80 BPM | RESPIRATION RATE: 18 BRPM | DIASTOLIC BLOOD PRESSURE: 88 MMHG | OXYGEN SATURATION: 99 % | SYSTOLIC BLOOD PRESSURE: 140 MMHG

## 2024-01-24 DIAGNOSIS — N20.0 KIDNEY STONES: Primary | ICD-10-CM

## 2024-01-24 PROCEDURE — 99213 OFFICE O/P EST LOW 20 MIN: CPT | Performed by: PHYSICIAN ASSISTANT

## 2024-01-24 NOTE — PROGRESS NOTES
"  UROLOGY PROGRESS NOTE   Patient Identifiers: Camerno Rosales (MRN [id number])  Date of Service: 1/24/2024    Subjective:   33-year-old man with history of kidney stones.  He had a ureteroscopy in September with self stent removal.  Follow-up ultrasound shows small bilateral stones.  His previous stone was about a year prior.  He had no trouble after the stent was removed.  He was interviewed with the help of .    Reason for visit: Kidney stone follow-up    Objective:     VITALS:    Vitals:    01/24/24 1444   BP: 140/88   Pulse: 80   Resp: 18   SpO2: 99%           LABS:  Lab Results   Component Value Date    HGB 12.3 08/21/2023    HCT 37.9 08/21/2023    WBC 7.20 08/21/2023     08/21/2023   ]    Lab Results   Component Value Date    K 3.8 08/21/2023     08/21/2023    CO2 29 08/21/2023    BUN 17 08/21/2023    CREATININE 1.31 (H) 08/21/2023    CALCIUM 9.2 08/21/2023   ]        INPATIENT MEDS:    Current Outpatient Medications:     diclofenac potassium (CATAFLAM) 50 mg tablet, Take 1 tablet (50 mg total) by mouth 2 (two) times a day for 5 days, Disp: 10 tablet, Rfl: 0    oxybutynin (DITROPAN XL) 15 MG 24 hr tablet, Take 1 tablet (15 mg total) by mouth daily for 7 doses, Disp: 7 tablet, Rfl: 0    tamsulosin (FLOMAX) 0.4 mg, Take 1 capsule (0.4 mg total) by mouth daily at bedtime for 14 days, Disp: 14 capsule, Rfl: 0      Physical Exam:   /88 (BP Location: Left arm, Patient Position: Sitting, Cuff Size: Adult)   Pulse 80   Resp 18   Ht 5' 7\" (1.702 m)   Wt 77.6 kg (171 lb)   SpO2 99%   BMI 26.78 kg/m²   GEN: no acute distress    RESP: breathing comfortably with no accessory muscle use    ABD: soft, non-tender, non-distended   INCISION:    EXT: no significant peripheral edema       RADIOLOGY:    IMPRESSION:     1. Small bilateral nonobstructing renal calculi  2. Otherwise unremarkable appearance of the kidneys.  3. Post void bladder volume 17 cc    Assessment:   #1.  " Nephrolithiasis    Plan:   -Reviewed stone analysis and strategies for stone prevention and water consumption  -He does drink about 100 ounces of water per day  -Recommend he follow-up in 1 year with ultrasound prior to visit  -

## 2024-06-04 ENCOUNTER — HOSPITAL ENCOUNTER (EMERGENCY)
Facility: HOSPITAL | Age: 34
Discharge: HOME | End: 2024-06-04
Attending: EMERGENCY MEDICINE
Payer: COMMERCIAL

## 2024-06-04 VITALS
RESPIRATION RATE: 20 BRPM | DIASTOLIC BLOOD PRESSURE: 88 MMHG | HEART RATE: 88 BPM | TEMPERATURE: 97.9 F | OXYGEN SATURATION: 98 % | SYSTOLIC BLOOD PRESSURE: 137 MMHG

## 2024-06-04 DIAGNOSIS — K64.4 EXTERNAL HEMORRHOID: Primary | ICD-10-CM

## 2024-06-04 PROCEDURE — 99282 EMERGENCY DEPT VISIT SF MDM: CPT

## 2024-06-04 PROCEDURE — 99284 EMERGENCY DEPT VISIT MOD MDM: CPT | Performed by: EMERGENCY MEDICINE

## 2024-06-04 RX ORDER — DOCUSATE SODIUM 100 MG/1
100 CAPSULE, LIQUID FILLED ORAL 2 TIMES DAILY
Qty: 20 CAPSULE | Refills: 0 | Status: SHIPPED | OUTPATIENT
Start: 2024-06-05 | End: 2024-06-15

## 2024-06-04 RX ORDER — ACETAMINOPHEN 325 MG/1
975 TABLET ORAL ONCE
Status: COMPLETED | OUTPATIENT
Start: 2024-06-04 | End: 2024-06-04

## 2024-06-04 RX ORDER — LIDOCAINE HYDROCHLORIDE 20 MG/ML
JELLY TOPICAL ONCE
Status: DISCONTINUED | OUTPATIENT
Start: 2024-06-04 | End: 2024-06-04 | Stop reason: HOSPADM

## 2024-06-04 RX ORDER — DOCUSATE SODIUM 100 MG/1
100 CAPSULE, LIQUID FILLED ORAL ONCE
Status: COMPLETED | OUTPATIENT
Start: 2024-06-04 | End: 2024-06-04

## 2024-06-04 RX ORDER — OXYCODONE HYDROCHLORIDE 5 MG/1
5 TABLET ORAL ONCE
Status: COMPLETED | OUTPATIENT
Start: 2024-06-04 | End: 2024-06-04

## 2024-06-04 RX ORDER — NAPROXEN 500 MG/1
500 TABLET ORAL EVERY 12 HOURS PRN
Qty: 14 TABLET | Refills: 0 | Status: SHIPPED | OUTPATIENT
Start: 2024-06-04 | End: 2024-06-14

## 2024-06-04 RX ORDER — NAPROXEN 250 MG/1
500 TABLET ORAL ONCE
Status: COMPLETED | OUTPATIENT
Start: 2024-06-04 | End: 2024-06-04

## 2024-06-04 RX ADMIN — DOCUSATE SODIUM 100 MG: 100 CAPSULE, LIQUID FILLED ORAL at 21:09

## 2024-06-04 RX ADMIN — OXYCODONE HYDROCHLORIDE 5 MG: 5 TABLET ORAL at 21:07

## 2024-06-04 RX ADMIN — ACETAMINOPHEN 975 MG: 325 TABLET, FILM COATED ORAL at 21:07

## 2024-06-04 RX ADMIN — NAPROXEN 500 MG: 250 TABLET ORAL at 21:06

## 2024-06-04 NOTE — Clinical Note
Cameron Rosales was seen and treated in our emergency department on 6/4/2024.                Diagnosis:     Cameron  may return to work on return date.    He may return on this date: 06/10/2024         If you have any questions or concerns, please don't hesitate to call.      Dejan Oro MD    ______________________________           _______________          _______________  Hospital Representative                              Date                                Time

## 2024-06-05 NOTE — ED PROVIDER NOTES
History  Chief Complaint   Patient presents with    Rectal Pain     Pt shows picture of swelling near rectum during triage; denies bleeding;      Patient is a 33-year-old male seen in the emergency department with concern for rectal pain which began 1 day prior to evaluation.  Pain is apparently made worse with sitting/palpation.  Patient notes no fever, drainage, bleeding, abdominal pain, nausea, vomiting, weakness.  Patient tried a topical medication at home, without improvement of symptoms noted.  Patient notes no history of similar symptoms in the past.  Patient notes no other definite clear exacerbating or alleviating factors for his symptoms.        Prior to Admission Medications   Prescriptions Last Dose Informant Patient Reported? Taking?   diclofenac potassium (CATAFLAM) 50 mg tablet   No No   Sig: Take 1 tablet (50 mg total) by mouth 2 (two) times a day for 5 days   oxybutynin (DITROPAN XL) 15 MG 24 hr tablet   No No   Sig: Take 1 tablet (15 mg total) by mouth daily for 7 doses   tamsulosin (FLOMAX) 0.4 mg   No No   Sig: Take 1 capsule (0.4 mg total) by mouth daily at bedtime for 14 days      Facility-Administered Medications: None       History reviewed. No pertinent past medical history.    Past Surgical History:   Procedure Laterality Date    FL RETROGRADE PYELOGRAM  8/18/2023    FL RETROGRADE PYELOGRAM  9/20/2023    ID CYSTO BLADDER W/URETERAL CATHETERIZATION Right 8/18/2023    Procedure: CYSTOSCOPY RETROGRADE PYELOGRAM WITH INSERTION STENT URETERAL;  Surgeon: Max Godfrey MD;  Location:  MAIN OR;  Service: Urology    ID CYSTO/URETERO W/LITHOTRIPSY &INDWELL STENT INSRT Right 9/20/2023    Procedure: CYSTOSCOPY, DIAGNOSTIC URETEROSCOPY, RETROGRADE PYELOGRAM AND URETERAL STENT EXCHANGE;  Surgeon: Ron Kraft MD;  [facility]: [facility];  Service: Urology       History reviewed. No pertinent family history.  I have reviewed and agree with the history as documented.    E-Cigarette/Vaping     E-Cigarette Use Never User      E-Cigarette/Vaping Substances     Social History     Tobacco Use    Smoking status: Never    Smokeless tobacco: Never   Vaping Use    Vaping status: Never Used   Substance Use Topics    Alcohol use: Yes     Comment: 1 weekly    Drug use: Never       Review of Systems   Constitutional:  Negative for chills and fever.   HENT:  Negative for ear pain and sore throat.    Eyes:  Negative for pain and visual disturbance.   Respiratory:  Negative for cough and shortness of breath.    Cardiovascular:  Negative for chest pain and palpitations.   Gastrointestinal:  Positive for rectal pain. Negative for abdominal pain, constipation, nausea and vomiting.   Genitourinary:  Negative for decreased urine volume and difficulty urinating.   Musculoskeletal:  Negative for gait problem and neck stiffness.   Skin:  Negative for color change and rash.   Neurological:  Negative for seizures and syncope.   Psychiatric/Behavioral:  Negative for agitation and confusion.    All other systems reviewed and are negative.      Physical Exam  Physical Exam  Vitals and nursing note reviewed.   Constitutional:       General: He is not in acute distress.     Appearance: He is well-developed.   HENT:      Head: Normocephalic and atraumatic.      Right Ear: External ear normal.      Left Ear: External ear normal.      Nose: Nose normal.      Mouth/Throat:      Pharynx: Oropharynx is clear.   Eyes:      General: No scleral icterus.     Conjunctiva/sclera: Conjunctivae normal.   Cardiovascular:      Rate and Rhythm: Normal rate.      Comments: well-perfused extremities  Pulmonary:      Effort: Pulmonary effort is normal. No respiratory distress.   Abdominal:      General: Abdomen is flat. There is no distension.   Genitourinary:     Comments: Rectal exam(chaperoned by RN)-  large external hemorrhoid and right perianal area, tender to palpation; no active bleeding or evidence of thrombosed hemorrhoid noted; normal  tone  Musculoskeletal:         General: No deformity or signs of injury.      Cervical back: Normal range of motion and neck supple.   Skin:     General: Skin is warm and dry.   Neurological:      General: No focal deficit present.      Mental Status: He is alert.      Cranial Nerves: No cranial nerve deficit.      Sensory: No sensory deficit.   Psychiatric:         Mood and Affect: Mood normal.         Thought Content: Thought content normal.         Vital Signs  ED Triage Vitals   Temperature Pulse Respirations Blood Pressure SpO2   06/04/24 2032 06/04/24 2031 06/04/24 2031 06/04/24 2031 06/04/24 2031   97.9 °F (36.6 °C) 88 20 137/88 98 %      Temp Source Heart Rate Source Patient Position - Orthostatic VS BP Location FiO2 (%)   06/04/24 2032 06/04/24 2031 06/04/24 2031 06/04/24 2031 --   Oral Monitor Sitting Left arm       Pain Score       06/04/24 2106       5           Vitals:    06/04/24 2031   BP: 137/88   Pulse: 88   Patient Position - Orthostatic VS: Sitting         Visual Acuity      ED Medications  Medications   lidocaine (URO-JET) 2 % urethral/mucosal gel (has no administration in time range)   acetaminophen (TYLENOL) tablet 975 mg (975 mg Oral Given 6/4/24 2107)   oxyCODONE (ROXICODONE) IR tablet 5 mg (5 mg Oral Given 6/4/24 2107)   naproxen (NAPROSYN) tablet 500 mg (500 mg Oral Given 6/4/24 2106)   docusate sodium (COLACE) capsule 100 mg (100 mg Oral Given 6/4/24 2109)       Diagnostic Studies  Results Reviewed       None                   No orders to display              Procedures  Procedures         ED Course                                             Medical Decision Making  Patient is a 33-year-old male seen in the emergency department with concern for rectal pain, apparent external hemorrhoid.  Evaluation is not consistent with abscess or infection.  Medication was ordered for symptom control. Plan to treat patient with course of medication for symptom control, and have patient follow up with  surgery/outpatient providers.  Patient stable for discharge home. Discharge instructions were reviewed with patient.    Problems Addressed:  External hemorrhoid: acute illness or injury    Risk  OTC drugs.  Prescription drug management.             Disposition  Final diagnoses:   External hemorrhoid     Time reflects when diagnosis was documented in both MDM as applicable and the Disposition within this note       Time User Action Codes Description Comment    6/4/2024  8:51 PM Dejan Oro Add [K64.4] External hemorrhoid           ED Disposition       ED Disposition   Discharge    Condition   Stable    Date/Time   Tue Jun 4, 2024  8:57 PM    Comment   Cameron Rosales discharge to home/self care.                   Follow-up Information       Follow up With Specialties Details Why Contact Info Additional Information    Your primary doctor  Call in 1 day       [facility] Family Medicine Call  As needed [address] [facility] [phone]  [phone] [facility], [address] [place], [address]   [phone]    [facility] General Surgery [place] [facility] Call  As needed [address] [facility] [address]  [phone] [facility] [place],[address] [place], [address], [phone]    [facility] Colon and Rectal Surgery [place] [facility] Call  As needed [address] [facility]  [address]  [facility] [address]  [phone] [facility] Rectal [facility] [place], [address] [place] [address], [facility] [address], [place] [address], [phone]            Patient's Medications   Discharge Prescriptions    DOCUSATE SODIUM (COLACE) 100 MG CAPSULE    Take 1 capsule (100 mg total) by mouth 2 (two) times a day for 10 days Do not start before June 5, 2024.       Start Date: 6/5/2024  End Date: 6/15/2024       Order Dose: 100 mg       Quantity: 20 capsule    Refills: 0    NAPROXEN (NAPROSYN) 500 MG TABLET     Take 1 tablet (500 mg total) by mouth every 12 (twelve) hours as needed (pain) for up to 10 days Take with food.       Start Date: 6/4/2024  End Date: 6/14/2024       Order Dose: 500 mg       Quantity: 14 tablet    Refills: 0    PHENYLEPHRINE (HEMORRHOIDAL) 0.25 % SUPPOSITORY    Insert 1 suppository into the rectum 2 (two) times a day as needed for hemorrhoids for up to 10 days       Start Date: 6/4/2024  End Date: 6/14/2024       Order Dose: 1 suppository       Quantity: 12 suppository    Refills: 0           PDMP Review         Value Time User    PDMP Reviewed  Yes 8/17/2023  3:39 PM Dejan Hairston MD            ED Provider  Electronically Signed by             Dejan Oro MD  06/04/24 9642

## 2025-02-28 ENCOUNTER — TELEPHONE (OUTPATIENT)
Dept: UROLOGY | Facility: CLINIC | Age: 35
End: 2025-02-28

## 2025-02-28 NOTE — TELEPHONE ENCOUNTER
1st attempt, called patient to assist in scheduling appointment for the US Kidney and Bladder before his Fu appointment with Miguel Ángel on 3/7 @ 11:30am and was unsuccessful. Left patient a voicemail message to CB the office at [phone].

## 2025-03-03 ENCOUNTER — TELEPHONE (OUTPATIENT)
Dept: UROLOGY | Facility: CLINIC | Age: 35
End: 2025-03-03

## 2025-03-03 NOTE — TELEPHONE ENCOUNTER
New Script Request for US Kidney and Bladder . Patient would like to still come in for an appointment. I will reschedule appointment for after the order is completed. Please advise    Thank you in advance.

## 2025-03-04 ENCOUNTER — TELEPHONE (OUTPATIENT)
Dept: UROLOGY | Facility: CLINIC | Age: 35
End: 2025-03-04

## 2025-03-04 DIAGNOSIS — N20.0 KIDNEY STONES: Primary | ICD-10-CM

## 2025-03-04 NOTE — TELEPHONE ENCOUNTER
Scheduled patient for the US Kidney and Bladder for March 13, 2025 @ 5:30 pm as per patient evening appointment request. Called patient to schedule next available FU appointment with Miguel Ángel and left a voicemail message to CB the office at [phone].

## 2025-03-04 NOTE — TELEPHONE ENCOUNTER
Freddie Hitchcock,    Good day,    Please kindly assist,    Can you please schedule with Miguel Ángel as a 1Y  Fu for the Ultrasound results at the [place] location on 3/28 @ 3pm in NP slot and switch to FU. Patient prefers a Friday afternoon appointment. I will call patient to confirm date once schedule.    Please advise.    Thank you in advance.

## 2025-03-13 ENCOUNTER — HOSPITAL ENCOUNTER (OUTPATIENT)
Dept: ULTRASOUND IMAGING | Facility: HOSPITAL | Age: 35
Discharge: HOME | End: 2025-03-13
Payer: COMMERCIAL

## 2025-03-13 DIAGNOSIS — N20.0 KIDNEY STONES: ICD-10-CM

## 2025-03-13 PROCEDURE — 76775 US EXAM ABDO BACK WALL LIM: CPT

## 2025-03-28 ENCOUNTER — OFFICE VISIT (OUTPATIENT)
Age: 35
End: 2025-03-28

## 2025-03-28 VITALS
HEART RATE: 57 BPM | HEIGHT: 60 IN | SYSTOLIC BLOOD PRESSURE: 110 MMHG | DIASTOLIC BLOOD PRESSURE: 80 MMHG | BODY MASS INDEX: 32.2 KG/M2 | WEIGHT: 164 LBS | OXYGEN SATURATION: 99 %

## 2025-03-28 DIAGNOSIS — N20.0 KIDNEY STONES: Primary | ICD-10-CM

## 2025-03-28 PROCEDURE — 99213 OFFICE O/P EST LOW 20 MIN: CPT | Performed by: PHYSICIAN ASSISTANT

## 2025-03-28 NOTE — PROGRESS NOTES
UROLOGY PROGRESS NOTE   Patient Identifiers: Cameron Rosales (MRN [id number])  Date of Service: 3/28/2025  Patient seen and examined with the assistance of online   Subjective:   34-year-old male history of kidney stones.  He to follow-up ultrasound showing small bilateral stones.  He complains of left flank pain.  He has not passed any stones.    Reason for visit: Kidney stone follow-up    Objective:     VITALS:    Vitals:    03/28/25 1454   BP: 110/80   Pulse: 57   SpO2: 99%           LABS:  Lab Results   Component Value Date    HGB 12.3 08/21/2023    HCT 37.9 08/21/2023    WBC 7.20 08/21/2023     08/21/2023   ]    Lab Results   Component Value Date    K 3.8 08/21/2023     08/21/2023    CO2 29 08/21/2023    BUN 17 08/21/2023    CREATININE 1.31 (H) 08/21/2023    CALCIUM 9.2 08/21/2023   ]        INPATIENT MEDS:    Current Outpatient Medications:     diclofenac potassium (CATAFLAM) 50 mg tablet, Take 1 tablet (50 mg total) by mouth 2 (two) times a day for 5 days, Disp: 10 tablet, Rfl: 0    docusate sodium (COLACE) 100 mg capsule, Take 1 capsule (100 mg total) by mouth 2 (two) times a day for 10 days Do not start before June 5, 2024., Disp: 20 capsule, Rfl: 0    naproxen (Naprosyn) 500 mg tablet, Take 1 tablet (500 mg total) by mouth every 12 (twelve) hours as needed (pain) for up to 10 days Take with food., Disp: 14 tablet, Rfl: 0    oxybutynin (DITROPAN XL) 15 MG 24 hr tablet, Take 1 tablet (15 mg total) by mouth daily for 7 doses, Disp: 7 tablet, Rfl: 0    phenylephrine (HEMORRHOIDAL) 0.25 % suppository, Insert 1 suppository into the rectum 2 (two) times a day as needed for hemorrhoids for up to 10 days, Disp: 12 suppository, Rfl: 0    tamsulosin (FLOMAX) 0.4 mg, Take 1 capsule (0.4 mg total) by mouth daily at bedtime for 14 days, Disp: 14 capsule, Rfl: 0      Physical Exam:   /80 (BP Location: Left arm, Patient Position: Sitting, Cuff Size: Standard)   Pulse 57    Ht 5' (1.524 m)   Wt 74.4 kg (164 lb)   SpO2 99%   BMI 32.03 kg/m²   GEN: no acute distress    RESP: breathing comfortably with no accessory muscle use    ABD: soft, non-tender, non-distended   INCISION:    EXT: no significant peripheral edema       RADIOLOGY:   IMPRESSION:     Bilateral nonobstructive nephroliths again demonstrated. Such stones are typically asymptomatic unless sloughed into the collecting system. No hydronephrosis or other acute findings.        Assessment:   #1.  Nephrolithiasis    Plan:   -Will get a CT stone study to clarify stone burden and call with the results  -  -  -

## (undated) DEVICE — CHLORHEXIDINE 4PCT 4 OZ

## (undated) DEVICE — PACK TUR

## (undated) DEVICE — CATH URET .038 10FR 50CM DUAL LUMEN

## (undated) DEVICE — CATH URETERAL 5FR X 70 CM FLEX TIP POLYUR BARD

## (undated) DEVICE — GUIDEWIRE STRGHT TIP 0.035 IN  SOLO PLUS

## (undated) DEVICE — PREMIUM DRY TRAY LF: Brand: MEDLINE INDUSTRIES, INC.

## (undated) DEVICE — GLOVE SRG BIOGEL 8

## (undated) DEVICE — STERILE CYSTO PACK: Brand: CARDINAL HEALTH

## (undated) DEVICE — SPECIMEN CONTAINER STERILE PEEL PACK

## (undated) DEVICE — INVIEW CLEAR LEGGINGS: Brand: CONVERTORS

## (undated) DEVICE — GLOVE SRG BIOGEL ECLIPSE 7.5

## (undated) DEVICE — STERILE SURGICAL LUBRICANT,  TUBE: Brand: SURGILUBE

## (undated) DEVICE — GLOVE INDICATOR PI UNDERGLOVE SZ 8 BLUE

## (undated) DEVICE — UROLOGIC DRAIN BAG: Brand: UNBRANDED